# Patient Record
Sex: MALE | Race: WHITE | NOT HISPANIC OR LATINO | Employment: OTHER | URBAN - METROPOLITAN AREA
[De-identification: names, ages, dates, MRNs, and addresses within clinical notes are randomized per-mention and may not be internally consistent; named-entity substitution may affect disease eponyms.]

---

## 2017-04-11 ENCOUNTER — OFFICE VISIT (OUTPATIENT)
Dept: MEDICAL GROUP | Age: 57
End: 2017-04-11
Payer: COMMERCIAL

## 2017-04-11 VITALS
SYSTOLIC BLOOD PRESSURE: 110 MMHG | WEIGHT: 179 LBS | HEART RATE: 77 BPM | TEMPERATURE: 97.5 F | DIASTOLIC BLOOD PRESSURE: 64 MMHG | BODY MASS INDEX: 22.97 KG/M2 | HEIGHT: 74 IN | OXYGEN SATURATION: 98 %

## 2017-04-11 DIAGNOSIS — F51.01 PRIMARY INSOMNIA: ICD-10-CM

## 2017-04-11 DIAGNOSIS — Z23 NEED FOR VACCINATION: ICD-10-CM

## 2017-04-11 DIAGNOSIS — C61 PROSTATE CARCINOMA (HCC): Chronic | ICD-10-CM

## 2017-04-11 DIAGNOSIS — E55.9 VITAMIN D DEFICIENCY: ICD-10-CM

## 2017-04-11 DIAGNOSIS — D48.9 NEOPLASM OF UNCERTAIN BEHAVIOR: ICD-10-CM

## 2017-04-11 DIAGNOSIS — Z00.00 PREVENTATIVE HEALTH CARE: ICD-10-CM

## 2017-04-11 PROCEDURE — 90715 TDAP VACCINE 7 YRS/> IM: CPT | Performed by: FAMILY MEDICINE

## 2017-04-11 PROCEDURE — 99214 OFFICE O/P EST MOD 30 MIN: CPT | Mod: 25 | Performed by: FAMILY MEDICINE

## 2017-04-11 PROCEDURE — 90471 IMMUNIZATION ADMIN: CPT | Performed by: FAMILY MEDICINE

## 2017-04-11 RX ORDER — ESZOPICLONE 3 MG/1
3 TABLET, FILM COATED ORAL
Qty: 30 TAB | Refills: 0 | Status: SHIPPED | OUTPATIENT
Start: 2017-04-11 | End: 2017-07-12 | Stop reason: SDUPTHER

## 2017-04-11 NOTE — PROGRESS NOTES
This medical record contains text that has been entered with the assistance of computer voice recognition and dictation software.  Therefore, it may contain unintended errors in text, spelling, punctuation, or grammar    No chief complaint on file.      Joel Cobb is a 56 y.o. male here evaluation and management of: Mole on back, insomnia, prostate cancer, hyperlipidemia      HPI:     Neoplasm of uncertain behavior  New issue    Patient states he's felt a irritable itchy mole on his mid back for the past 5 months. He is not certain if it's grown   but it's very itchy and he scratches it becomes red.   He does sunbathe regularly recently returned from [place]    Prostate carcinoma  Patient is followed by [facility]  Has an appointment next week  Last PSA was zero    Insomnia  Patient has been using Lunesta for years   He has not had any adverse events  She is an  and travels throughout the world  He works for a defense [employer]    Current medicines (including changes today)  Current Outpatient Prescriptions   Medication Sig Dispense Refill   • Eszopiclone (LUNESTA) 3 MG Tab Take 1 Tab by mouth at bedtime as needed. 30 Tab 0   • atorvastatin (LIPITOR) 10 MG Tab TAKE 1 TABLET BY MOUTH EVERY BEDTIME. 90 Tab 0   • sildenafil citrate (VIAGRA) 100 MG tablet Take 1 Tab by mouth as needed for Erectile Dysfunction. 10 Tab 3   • valacyclovir (VALTREX) 1 GM Tab Take 0.5 Tabs by mouth 3 times a day. For 5-7 days prn rash. 30 Tab 11     No current facility-administered medications for this visit.     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  He  has past surgical history that includes vein stripping and prostatectomy, radical retro (10/22/14).  Social History   Substance Use Topics   • Smoking status: Never Smoker    • Smokeless tobacco: Never Used   • Alcohol Use: 0.5  "- 7.0 oz/week     1-14 Cans of beer per week     Social History     Social History Narrative    . Travels to [place], [place], [place] several times per year.      Family History   Problem Relation Age of Onset   • Heart Disease Father      CHF   • Stroke Father 60   • Hypertension Father    • Psychiatry Paternal Uncle      Suicide   • Cancer Maternal Aunt      lung     Family Status   Relation Status Death Age   • Father  69     CHF, emphysema   • Mother Alive          ROS  Please see history of present illness    al other systems reviewed and are negative     Objective:     Blood pressure 110/64, pulse 77, temperature 36.4 °C (97.5 °F), height 1.88 m (6' 2.02\"), weight 81.194 kg (179 lb), SpO2 98 %. Body mass index is 22.97 kg/(m^2).  Physical Exam:    Constitutional: Alert, no distress.  Skin: Warm, dry, good turgor, no rashes in visible areas.  Eye: Equal, round and reactive, conjunctiva clear, lids normal.  ENMT: Lips without lesions, good dentition, oropharynx clear.  Neck: Trachea midline, no masses, no thyromegaly. No cervical or supraclavicular lymphadenopathy.  Respiratory: Unlabored respiratory effort, lungs clear to auscultation, no wheezes, no ronchi.  Cardiovascular: Normal S1, S2, no murmur, no edema.  Abdomen: Soft, non-tender, no masses, no hepatosplenomegaly.  Psych: Alert and oriented x3, normal affect and mood.  Back--0.3 mm round red raised, papular, regular borders, symmetric, one color        Assessment and Plan:   The following treatment plan was discussed, again this medical record contains text that has been entered with the assistance of computer voice recognition and dictation software.  Therefore, it may contain unintended errors in text, spelling, punctuation, or grammar    1. Neoplasm of uncertain behavior     I offered to remove this today  He would like to be seen in dermatology    - REFERRAL TO DERMATOLOGY    2. Vitamin D deficiency    Labs needed    - VITAMIN 1,25 " DIHYDROXY; Future    3. Preventative health care  Due for DTaP  - COMP METABOLIC PANEL; Future  - CBC WITH DIFFERENTIAL; Future  - LIPID PROFILE; Future    4. Primary insomnia  Patient has been stable with current management  We will make no changes for now  - Eszopiclone (LUNESTA) 3 MG Tab; Take 1 Tab by mouth at bedtime as needed.  Dispense: 30 Tab; Refill: 0    5. Need for vaccination  Given today    - TDAP VACCINE =>6YO IM    6. Prostate carcinoma (CMS-HCC)  Followed by LUCIE Elder        Followup: Return in about 3 months (around 7/11/2017) for Reevaluation.

## 2017-04-11 NOTE — ASSESSMENT & PLAN NOTE
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

## 2017-04-11 NOTE — MR AVS SNAPSHOT
"Joel Cobb   2017 8:20 AM   Office Visit   MRN: [id number]    Department:  [address] [place] [address]   Dept Phone:  [phone]    Description:  Male : 1960   Provider:  Ludy Vega M.D.           Allergies as of 2017     Allergen Noted Reactions    Nkda [No Known Drug Allergy] 10/19/2010         You were diagnosed with     Need for vaccination   [329037]       Vitamin D deficiency   [7443790]       Preventative health care   [223607]       Primary insomnia   [963814]       Neoplasm of uncertain behavior   [391466]       Prostate carcinoma (CMS-HCC)   [620386]         Vital Signs     Blood Pressure Pulse Temperature Height Weight Body Mass Index    110/64 mmHg 77 36.4 °C (97.5 °F) 1.88 m (6' 2.02\") 81.194 kg (179 lb) 22.97 kg/m2    Oxygen Saturation Smoking Status                98% Never Smoker           Basic Information     Date Of Birth Sex Race Ethnicity Preferred Language    1960 Male White Non- English      Your appointments     2017  2:30 PM   PROCEDURE 30 with Reyes Smith M.D.   [facility] (--)    [address] [place] [address]   [phone]           Your procedure is scheduled at Special Procedures at [facility] located at [address] [place] [address] just east of the main [place]. Please check in at the front lobby desk 1 hour prior to your appointment time. For your safety, please have a ride home with a responsible adult.             2017  8:40 AM   Established Patient with Ludy Vega M.D.   [address])    [address] [place] [id number] [phone]           You will be receiving a confirmation call a few days before your appointment from our automated call confirmation system.              Problem List              ICD-10-CM Priority Class Noted - Resolved    HSV-2 infection B00.9   10/19/2010 - Present    Prostate carcinoma (CMS-HCC) (Chronic) C61   2012 - Present  "    Degenerative arthritis of cervical spine M47.812   8/12/2015 - Present    Dyslipidemia E78.5   8/12/2015 - Present    Neck pain M54.2   8/13/2015 - Present    Cervical radiculopathy at C7 M54.12   10/10/2015 - Present    ED (erectile dysfunction) N52.9   10/11/2016 - Present    Insomnia G47.00   10/11/2016 - Present    Vitamin D deficiency E55.9   4/11/2017 - Present    Need for vaccination Z23   4/11/2017 - Present    Preventative health care Z00.00   4/11/2017 - Present    Neoplasm of uncertain behavior D48.9   4/11/2017 - Present      Health Maintenance        Date Due Completion Dates    IMM DTaP/Tdap/Td Vaccine (1 - Tdap) 11/30/1980 ---    PSA Screening 1/23/2014 1/23/2013, 12/14/2012, 9/4/2012    COLONOSCOPY 10/1/2022 10/1/2012 (Done)    Override on 10/1/2012: Done            Current Immunizations     Hepatitis A Vaccine, Ped/Adol 9/16/2011, 10/19/2010    Hepatitis B Vaccine Non-Recombivax (Ped/Adol) 8/28/2012, 9/16/2011    Influenza Vaccine Pediatric 10/19/2010    Tdap Vaccine  Incomplete      Below and/or attached are the medications your provider expects you to take. Review all of your home medications and newly ordered medications with your provider and/or pharmacist. Follow medication instructions as directed by your provider and/or pharmacist. Please keep your medication list with you and share with your provider. Update the information when medications are discontinued, doses are changed, or new medications (including over-the-counter products) are added; and carry medication information at all times in the event of emergency situations     Allergies:  NKDA - (reactions not documented)               Medications  Valid as of: April 11, 2017 -  8:51 AM    Generic Name Brand Name Tablet Size Instructions for use    Atorvastatin Calcium (Tab) LIPITOR 10 MG TAKE 1 TABLET BY MOUTH EVERY BEDTIME.        Eszopiclone (Tab) Eszopiclone 3 MG Take 1 Tab by mouth at bedtime as needed.        Sildenafil Citrate  (Tab) VIAGRA 100 MG Take 1 Tab by mouth as needed for Erectile Dysfunction.        ValACYclovir HCl (Tab) VALTREX 1 GM Take 0.5 Tabs by mouth 3 times a day. For 5-7 days prn rash.        .                 Medicines prescribed today were sent to:     [facility]/PHARMACY #[id number] - [place], [place] - [address]    [address] [place] [address]    Phone: [phone] Fax: [phone]    Open 24 Hours?: No      Medication refill instructions:       If your prescription bottle indicates you have medication refills left, it is not necessary to call your provider’s office. Please contact your pharmacy and they will refill your medication.    If your prescription bottle indicates you do not have any refills left, you may request refills at any time through one of the following ways: The online [employer] system (except Urgent Care), by calling your provider’s office, or by asking your pharmacy to contact your provider’s office with a refill request. Medication refills are processed only during regular business hours and may not be available until the next business day. Your provider may request additional information or to have a follow-up visit with you prior to refilling your medication.   *Please Note: Medication refills are assigned a new Rx number when refilled electronically. Your pharmacy may indicate that no refills were authorized even though a new prescription for the same medication is available at the pharmacy. Please request the medicine by name with the pharmacy before contacting your provider for a refill.        Your To Do List     Future Labs/Procedures Complete By Expires    CBC WITH DIFFERENTIAL  As directed 4/11/2018    COMP METABOLIC PANEL  As directed 4/11/2018    LIPID PROFILE  As directed 4/11/2018    VITAMIN 1,25 DIHYDROXY  As directed 4/11/2018      Referral     A referral request has been sent to our patient care coordination department. Please allow 3-5 business days for us to process this request  and contact you either by phone or mail. If you do not hear from us by the 5th business day, please call us at [phone].           [employer] Access Code: Activation code not generated  Current [employer] Status: Active

## 2017-04-11 NOTE — ASSESSMENT & PLAN NOTE
New issue    Patient states he's felt a irritable itchy mole on his mid back for the past 5 months. He is not certain if it's gone but it's very itchy and he scratches it becomes red. He does sunbathe regularly recently returned from [place]

## 2017-04-13 ENCOUNTER — HOSPITAL ENCOUNTER (OUTPATIENT)
Dept: RADIOLOGY | Facility: REHABILITATION | Age: 57
End: 2017-04-13
Attending: PHYSICAL MEDICINE & REHABILITATION
Payer: COMMERCIAL

## 2017-04-13 ENCOUNTER — HOSPITAL ENCOUNTER (OUTPATIENT)
Dept: PAIN MANAGEMENT | Facility: REHABILITATION | Age: 57
End: 2017-04-13
Attending: PHYSICAL MEDICINE & REHABILITATION
Payer: COMMERCIAL

## 2017-04-13 VITALS
OXYGEN SATURATION: 99 % | BODY MASS INDEX: 23.71 KG/M2 | WEIGHT: 175.04 LBS | HEART RATE: 47 BPM | RESPIRATION RATE: 16 BRPM | DIASTOLIC BLOOD PRESSURE: 79 MMHG | SYSTOLIC BLOOD PRESSURE: 120 MMHG | TEMPERATURE: 98.2 F | HEIGHT: 72 IN

## 2017-04-13 PROCEDURE — 62321 NJX INTERLAMINAR CRV/THRC: CPT

## 2017-04-13 PROCEDURE — 700111 HCHG RX REV CODE 636 W/ 250 OVERRIDE (IP)

## 2017-04-13 PROCEDURE — 700117 HCHG RX CONTRAST REV CODE 255

## 2017-04-13 PROCEDURE — 99152 MOD SED SAME PHYS/QHP 5/>YRS: CPT

## 2017-04-13 RX ORDER — MIDAZOLAM HYDROCHLORIDE 1 MG/ML
INJECTION INTRAMUSCULAR; INTRAVENOUS
Status: COMPLETED
Start: 2017-04-13 | End: 2017-04-13

## 2017-04-13 RX ORDER — DEXAMETHASONE SODIUM PHOSPHATE 10 MG/ML
INJECTION, SOLUTION INTRAMUSCULAR; INTRAVENOUS
Status: COMPLETED
Start: 2017-04-13 | End: 2017-04-13

## 2017-04-13 RX ORDER — LIDOCAINE HYDROCHLORIDE 10 MG/ML
INJECTION, SOLUTION EPIDURAL; INFILTRATION; INTRACAUDAL; PERINEURAL
Status: COMPLETED
Start: 2017-04-13 | End: 2017-04-13

## 2017-04-13 RX ADMIN — MIDAZOLAM HYDROCHLORIDE 1 MG: 1 INJECTION, SOLUTION INTRAMUSCULAR; INTRAVENOUS at 15:15

## 2017-04-13 RX ADMIN — LIDOCAINE HYDROCHLORIDE 1 ML: 10 INJECTION, SOLUTION EPIDURAL; INFILTRATION; INTRACAUDAL; PERINEURAL at 15:20

## 2017-04-13 RX ADMIN — FENTANYL CITRATE 50 MCG: 50 INJECTION, SOLUTION INTRAMUSCULAR; INTRAVENOUS at 15:15

## 2017-04-13 RX ADMIN — LIDOCAINE HYDROCHLORIDE 2 ML: 10 INJECTION, SOLUTION EPIDURAL; INFILTRATION; INTRACAUDAL; PERINEURAL at 15:16

## 2017-04-13 RX ADMIN — IOHEXOL 1 ML: 240 INJECTION, SOLUTION INTRATHECAL; INTRAVASCULAR; INTRAVENOUS; ORAL at 15:18

## 2017-04-13 RX ADMIN — DEXAMETHASONE SODIUM PHOSPHATE 10 MG: 10 INJECTION, SOLUTION INTRAMUSCULAR; INTRAVENOUS at 15:20

## 2017-04-13 ASSESSMENT — PAIN SCALES - GENERAL
PAINLEVEL_OUTOF10: 2

## 2017-04-13 NOTE — PROGRESS NOTES
Dr. Smith spoke to the patient and the nurse that he will do Cervical 7 - thoracic 1 instead of ( C6-C7) ( pre- order). Patient agreed and consent corrected and initialed  by patient and me ( saturnino Hager RN).

## 2017-04-13 NOTE — PROGRESS NOTES
Current meds. See medication reconciliation form. Reviewed with pt. Pt denies taking ASA,other blood thinners or anti-inflammatories. Pt has a ride post-procedure (Elena holman is ). Printed and verbal discharge instructions given to pt who verbalized understanding.

## 2017-04-22 ENCOUNTER — HOSPITAL ENCOUNTER (OUTPATIENT)
Dept: LAB | Facility: MEDICAL CENTER | Age: 57
End: 2017-04-22
Attending: FAMILY MEDICINE
Payer: COMMERCIAL

## 2017-04-22 DIAGNOSIS — E55.9 VITAMIN D DEFICIENCY: ICD-10-CM

## 2017-04-22 DIAGNOSIS — Z00.00 PREVENTATIVE HEALTH CARE: ICD-10-CM

## 2017-04-22 LAB
ALBUMIN SERPL BCP-MCNC: 4 G/DL (ref 3.2–4.9)
ALBUMIN/GLOB SERPL: 1.5 G/DL
ALP SERPL-CCNC: 55 U/L (ref 30–99)
ALT SERPL-CCNC: 21 U/L (ref 2–50)
ANION GAP SERPL CALC-SCNC: 4 MMOL/L (ref 0–11.9)
AST SERPL-CCNC: 20 U/L (ref 12–45)
BASOPHILS # BLD AUTO: 1.1 % (ref 0–1.8)
BASOPHILS # BLD: 0.04 K/UL (ref 0–0.12)
BILIRUB SERPL-MCNC: 1.2 MG/DL (ref 0.1–1.5)
BUN SERPL-MCNC: 13 MG/DL (ref 8–22)
CALCIUM SERPL-MCNC: 9.1 MG/DL (ref 8.5–10.5)
CHLORIDE SERPL-SCNC: 108 MMOL/L (ref 96–112)
CHOLEST SERPL-MCNC: 123 MG/DL (ref 100–199)
CO2 SERPL-SCNC: 27 MMOL/L (ref 20–33)
CREAT SERPL-MCNC: 0.94 MG/DL (ref 0.5–1.4)
EOSINOPHIL # BLD AUTO: 0.09 K/UL (ref 0–0.51)
EOSINOPHIL NFR BLD: 2.4 % (ref 0–6.9)
ERYTHROCYTE [DISTWIDTH] IN BLOOD BY AUTOMATED COUNT: 43.3 FL (ref 35.9–50)
GFR SERPL CREATININE-BSD FRML MDRD: >60 ML/MIN/1.73 M 2
GLOBULIN SER CALC-MCNC: 2.7 G/DL (ref 1.9–3.5)
GLUCOSE SERPL-MCNC: 94 MG/DL (ref 65–99)
HCT VFR BLD AUTO: 43.9 % (ref 42–52)
HDLC SERPL-MCNC: 45 MG/DL
HGB BLD-MCNC: 14.8 G/DL (ref 14–18)
IMM GRANULOCYTES # BLD AUTO: 0 K/UL (ref 0–0.11)
IMM GRANULOCYTES NFR BLD AUTO: 0 % (ref 0–0.9)
LDLC SERPL CALC-MCNC: 68 MG/DL
LYMPHOCYTES # BLD AUTO: 1.26 K/UL (ref 1–4.8)
LYMPHOCYTES NFR BLD: 34 % (ref 22–41)
MCH RBC QN AUTO: 30.9 PG (ref 27–33)
MCHC RBC AUTO-ENTMCNC: 33.7 G/DL (ref 33.7–35.3)
MCV RBC AUTO: 91.6 FL (ref 81.4–97.8)
MONOCYTES # BLD AUTO: 0.41 K/UL (ref 0–0.85)
MONOCYTES NFR BLD AUTO: 11.1 % (ref 0–13.4)
NEUTROPHILS # BLD AUTO: 1.91 K/UL (ref 1.82–7.42)
NEUTROPHILS NFR BLD: 51.4 % (ref 44–72)
NRBC # BLD AUTO: 0 K/UL
NRBC BLD AUTO-RTO: 0 /100 WBC
PLATELET # BLD AUTO: 193 K/UL (ref 164–446)
PMV BLD AUTO: 11.3 FL (ref 9–12.9)
POTASSIUM SERPL-SCNC: 4.6 MMOL/L (ref 3.6–5.5)
PROT SERPL-MCNC: 6.7 G/DL (ref 6–8.2)
RBC # BLD AUTO: 4.79 M/UL (ref 4.7–6.1)
SODIUM SERPL-SCNC: 139 MMOL/L (ref 135–145)
TRIGL SERPL-MCNC: 49 MG/DL (ref 0–149)
WBC # BLD AUTO: 3.7 K/UL (ref 4.8–10.8)

## 2017-04-22 PROCEDURE — 36415 COLL VENOUS BLD VENIPUNCTURE: CPT

## 2017-04-22 PROCEDURE — 85025 COMPLETE CBC W/AUTO DIFF WBC: CPT

## 2017-04-22 PROCEDURE — 82652 VIT D 1 25-DIHYDROXY: CPT

## 2017-04-22 PROCEDURE — 80053 COMPREHEN METABOLIC PANEL: CPT

## 2017-04-22 PROCEDURE — 80061 LIPID PANEL: CPT

## 2017-04-24 LAB — 1,25(OH)2D3 SERPL-MCNC: 42.7 PG/ML (ref 19.9–79.3)

## 2017-06-26 RX ORDER — ATORVASTATIN CALCIUM 10 MG/1
TABLET, FILM COATED ORAL
Refills: 0 | OUTPATIENT
Start: 2017-06-26

## 2017-06-26 NOTE — TELEPHONE ENCOUNTER
CALLED [facility] 06/26/2017 AND ASKED FOR THIS REQUEST TO BE RE-ROUTED TO CORRECT PCP OFFICE.

## 2017-06-27 RX ORDER — ATORVASTATIN CALCIUM 10 MG/1
TABLET, FILM COATED ORAL
Qty: 90 TAB | Refills: 0 | Status: SHIPPED | OUTPATIENT
Start: 2017-06-27 | End: 2017-10-10 | Stop reason: SDUPTHER

## 2017-07-12 ENCOUNTER — OFFICE VISIT (OUTPATIENT)
Dept: MEDICAL GROUP | Age: 57
End: 2017-07-12
Payer: COMMERCIAL

## 2017-07-12 VITALS
SYSTOLIC BLOOD PRESSURE: 108 MMHG | HEART RATE: 58 BPM | OXYGEN SATURATION: 98 % | HEIGHT: 72 IN | DIASTOLIC BLOOD PRESSURE: 66 MMHG | WEIGHT: 169.2 LBS | BODY MASS INDEX: 22.92 KG/M2 | TEMPERATURE: 97.3 F

## 2017-07-12 DIAGNOSIS — F51.01 PRIMARY INSOMNIA: ICD-10-CM

## 2017-07-12 DIAGNOSIS — Z00.00 PREVENTATIVE HEALTH CARE: ICD-10-CM

## 2017-07-12 DIAGNOSIS — C61 PROSTATE CARCINOMA (HCC): Chronic | ICD-10-CM

## 2017-07-12 DIAGNOSIS — E78.5 DYSLIPIDEMIA: ICD-10-CM

## 2017-07-12 DIAGNOSIS — D48.9 NEOPLASM OF UNCERTAIN BEHAVIOR: ICD-10-CM

## 2017-07-12 PROCEDURE — 99214 OFFICE O/P EST MOD 30 MIN: CPT | Performed by: FAMILY MEDICINE

## 2017-07-12 RX ORDER — ESZOPICLONE 3 MG/1
3 TABLET, FILM COATED ORAL
Qty: 90 TAB | Refills: 0 | Status: SHIPPED | OUTPATIENT
Start: 2017-07-12 | End: 2024-02-09

## 2017-07-12 NOTE — ASSESSMENT & PLAN NOTE
He only uses Lunesta 3 mg daily trials across the country and abroad. He will be traveling to [facility] next week. He is an .

## 2017-07-12 NOTE — ASSESSMENT & PLAN NOTE
Patient is extremely fit and healthy. He exercises 6 days a week. Weights. He does eat a healthy diet. He was placed on omega 3 fatty acids, he is also taking atorvastatin 10 mg by mouth daily at bedtime.     Results for CLIFF RODRIGUEZ (MRN [id number]) as of 7/12/2017 11:06   Ref. Range 4/22/2017 07:51   Cholesterol,Tot Latest Ref Range: 100-199 mg/dL 123   Triglycerides Latest Ref Range: 0-149 mg/dL 49   HDL Latest Ref Range: >=40 mg/dL 45   LDL Latest Ref Range: <100 mg/dL 68      Please resend colon prep  To patients [facility].      Pharmacy information has been set up and verified.

## 2017-07-12 NOTE — PROGRESS NOTES
This medical record contains text that has been entered with the assistance of computer voice recognition and dictation software.  Therefore, it may contain unintended errors in text, spelling, punctuation, or grammar    Chief Complaint   Patient presents with   • Other     see reason for visit       Joel Cobb is a 56 y.o. male here evaluation and management of: insomnia, psa, hld    HPI:     Prostate carcinoma  Recent PSA  Zero 2 months ago  Continues to be managed by LUCIE Elder    Neoplasm of uncertain behavior  Mole was rejected by body  Resolved, he was seen in dermatology    Insomnia  He only uses Lunesta 3 mg daily trials across the country and abroad. He will be traveling to [facility] next week. He is an .    Dyslipidemia  Patient is extremely fit and healthy. He exercises 6 days a week. Weights. He does eat a healthy diet. He was placed on omega 3 fatty acids, he is also taking atorvastatin 10 mg by mouth daily at bedtime.     Results for JOEL COBB (MRN [id number]) as of 7/12/2017 11:06   Ref. Range 4/22/2017 07:51   Cholesterol,Tot Latest Ref Range: 100-199 mg/dL 123   Triglycerides Latest Ref Range: 0-149 mg/dL 49   HDL Latest Ref Range: >=40 mg/dL 45   LDL Latest Ref Range: <100 mg/dL 68         Current medicines (including changes today)  Current Outpatient Prescriptions   Medication Sig Dispense Refill   • aspirin EC (ECOTRIN) 81 MG Tablet Delayed Response Take 1 Tab by mouth every day. 180 Tab 0   • Eszopiclone (LUNESTA) 3 MG Tab Take 1 Tab by mouth at bedtime as needed. 90 Tab 0   • atorvastatin (LIPITOR) 10 MG Tab TAKE 1 TABLET BY MOUTH EVERY BEDTIME. 90 Tab 0   • valacyclovir (VALTREX) 1 GM Tab Take 0.5 Tabs by mouth 3 times a day. For 5-7 days prn rash. 30 Tab 11     No current facility-administered medications for this visit.     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  He  has past surgical history that includes vein stripping and prostatectomy, radical retro (10/22/14).  Social History   Substance Use Topics   • Smoking status: Never Smoker    • Smokeless tobacco: Never Used   • Alcohol Use: 0.5 - 7.0 oz/week     1-14 Cans of beer per week     Social History     Social History Narrative    . Travels to [place], [place], [place] several times per year.      Family History   Problem Relation Age of Onset   • Heart Disease Father      CHF   • Stroke Father 60   • Hypertension Father    • Psychiatry Paternal Uncle      Suicide   • Cancer Maternal Aunt      lung     Family Status   Relation Status Death Age   • Father  69     CHF, emphysema   • Mother Alive          ROS  Please see hpi    All other systems reviewed and are negative     Objective:     Blood pressure 108/66, pulse 58, temperature 36.3 °C (97.3 °F), height 1.829 m (6' 0.01\"), weight 76.749 kg (169 lb 3.2 oz), SpO2 98 %. Body mass index is 22.94 kg/(m^2).  Physical Exam:    Constitutional: Alert, no distress.  Skin: Warm, dry, good turgor, no rashes in visible areas.  Eye: Equal, round and reactive, conjunctiva clear, lids normal.  ENMT: Lips without lesions, good dentition, oropharynx clear.  Neck: Trachea midline, no masses, no thyromegaly. No cervical or supraclavicular lymphadenopathy.  Respiratory: Unlabored respiratory effort, lungs clear to auscultation, no wheezes, no ronchi.  Cardiovascular: Normal S1, S2, no murmur, no edema.  Abdomen: Soft, non-tender, no masses, no hepatosplenomegaly.  Psych: Alert and oriented x3, normal affect and mood.          Assessment and Plan:   The following treatment plan was discussed      1. Preventative health care  No contraindications for ASA    - aspirin EC (ECOTRIN) 81 MG Tablet Delayed Response; Take 1 Tab by mouth every day.  Dispense: 180 Tab; Refill: 0    2. Primary insomnia  Patient has been stable with current " management  We will make no changes for now    - Eszopiclone (LUNESTA) 3 MG Tab; Take 1 Tab by mouth at bedtime as needed.  Dispense: 90 Tab; Refill: 0    3. Prostate carcinoma (CMS-HCC)  Managed by [facility]    4. Neoplasm of uncertain behavior  Was evaluated by dermatology      HEALTH MAINTENANCE: up to date, psa done at [facility]    Instructed to Follow up in clinic or ER for worsening symptoms, difficulty breathing, lack of expected recovery, or should new symptoms or problems arise.    Followup: Return in about 6 months (around 1/12/2018) for Reevaluation.       Once again this medical record contains text that has been entered with the assistance of computer voice recognition and dictation software.  Therefore, it may contain unintended errors in text, spelling, punctuation, or grammar

## 2017-07-12 NOTE — MR AVS SNAPSHOT
"Joel Cobb   2017 8:40 AM   Office Visit   MRN: [id number]    Department:  [address] [place] [address]   Dept Phone:  [phone]    Description:  Male : 1960   Provider:  Ludy Vega M.D.           Reason for Visit     Other see reason for visit      Allergies as of 2017     Allergen Noted Reactions    Nkda [No Known Drug Allergy] 10/19/2010         You were diagnosed with     Preventative health care   [097409]       Primary insomnia   [505692]       Prostate carcinoma (CMS-HCC)   [576946]       Neoplasm of uncertain behavior   [095550]       Dyslipidemia   [079482]         Vital Signs     Blood Pressure Pulse Temperature Height Weight Body Mass Index    108/66 mmHg 58 36.3 °C (97.3 °F) 1.829 m (6' 0.01\") 76.749 kg (169 lb 3.2 oz) 22.94 kg/m2    Oxygen Saturation Smoking Status                98% Never Smoker           Basic Information     Date Of Birth Sex Race Ethnicity Preferred Language    1960 Male White Non- English      Problem List              ICD-10-CM Priority Class Noted - Resolved    HSV-2 infection B00.9   10/19/2010 - Present    Prostate carcinoma (CMS-HCC) (Chronic) C61   2012 - Present    Degenerative arthritis of cervical spine M47.812   2015 - Present    Dyslipidemia E78.5   2015 - Present    Neck pain M54.2   2015 - Present    Cervical radiculopathy at C7 M54.12   10/10/2015 - Present    ED (erectile dysfunction) N52.9   10/11/2016 - Present    Insomnia G47.00   10/11/2016 - Present    Vitamin D deficiency E55.9   2017 - Present    Need for vaccination Z23   2017 - Present    Preventative health care Z00.00   2017 - Present    Neoplasm of uncertain behavior D48.9   2017 - Present      Health Maintenance        Date Due Completion Dates    PSA Screening 2014, 2012, 2012    IMM INFLUENZA (1) 2017 10/19/2010    COLONOSCOPY 10/1/2022 10/1/2012 (Done)    Override on 10/1/2012: Done   "    IMM DTaP/Tdap/Td Vaccine (2 - Td) 4/11/2027 4/11/2017            Current Immunizations     Hepatitis A Vaccine, Ped/Adol 9/16/2011, 10/19/2010    Hepatitis B Vaccine Non-Recombivax (Ped/Adol) 8/28/2012, 9/16/2011    Influenza Vaccine Pediatric 10/19/2010    Tdap Vaccine 4/11/2017      Below and/or attached are the medications your provider expects you to take. Review all of your home medications and newly ordered medications with your provider and/or pharmacist. Follow medication instructions as directed by your provider and/or pharmacist. Please keep your medication list with you and share with your provider. Update the information when medications are discontinued, doses are changed, or new medications (including over-the-counter products) are added; and carry medication information at all times in the event of emergency situations     Allergies:  NKDA - (reactions not documented)               Medications  Valid as of: July 12, 2017 - 12:03 PM    Generic Name Brand Name Tablet Size Instructions for use    Aspirin (Tablet Delayed Response) ECOTRIN 81 MG Take 1 Tab by mouth every day.        Atorvastatin Calcium (Tab) LIPITOR 10 MG TAKE 1 TABLET BY MOUTH EVERY BEDTIME.        Eszopiclone (Tab) Eszopiclone 3 MG Take 1 Tab by mouth at bedtime as needed.        ValACYclovir HCl (Tab) VALTREX 1 GM Take 0.5 Tabs by mouth 3 times a day. For 5-7 days prn rash.        .                 Medicines prescribed today were sent to:     [facility]/PHARMACY #[id number] - [place], [place] - [address]    [address] [place] [address]    Phone: [phone] Fax: [phone]    Open 24 Hours?: No      Medication refill instructions:       If your prescription bottle indicates you have medication refills left, it is not necessary to call your provider’s office. Please contact your pharmacy and they will refill your medication.    If your prescription bottle indicates you do not have any refills left, you may request refills at any  time through one of the following ways: The online [employer] system (except Urgent Care), by calling your provider’s office, or by asking your pharmacy to contact your provider’s office with a refill request. Medication refills are processed only during regular business hours and may not be available until the next business day. Your provider may request additional information or to have a follow-up visit with you prior to refilling your medication.   *Please Note: Medication refills are assigned a new Rx number when refilled electronically. Your pharmacy may indicate that no refills were authorized even though a new prescription for the same medication is available at the pharmacy. Please request the medicine by name with the pharmacy before contacting your provider for a refill.           [employer] Access Code: Activation code not generated  Current [employer] Status: Active

## 2018-04-11 DIAGNOSIS — Z00.00 PREVENTATIVE HEALTH CARE: ICD-10-CM

## 2018-04-11 RX ORDER — ASPIRIN 81 MG/1
81 TABLET ORAL DAILY
Qty: 180 TAB | Refills: 0 | Status: SHIPPED | OUTPATIENT
Start: 2018-04-11 | End: 2020-03-11

## 2018-04-11 RX ORDER — ATORVASTATIN CALCIUM 10 MG/1
10 TABLET, FILM COATED ORAL
Qty: 90 TAB | Refills: 0 | Status: SHIPPED | OUTPATIENT
Start: 2018-04-11 | End: 2018-07-11 | Stop reason: SDUPTHER

## 2018-06-10 ENCOUNTER — PATIENT MESSAGE (OUTPATIENT)
Dept: MEDICAL GROUP | Age: 58
End: 2018-06-10

## 2018-06-10 DIAGNOSIS — B00.9 HSV-2 INFECTION: ICD-10-CM

## 2018-06-12 RX ORDER — VALACYCLOVIR HYDROCHLORIDE 1 G/1
500 TABLET, FILM COATED ORAL 3 TIMES DAILY
Qty: 30 TAB | Refills: 11 | Status: SHIPPED | OUTPATIENT
Start: 2018-06-12 | End: 2019-01-11 | Stop reason: SDUPTHER

## 2018-07-11 RX ORDER — ATORVASTATIN CALCIUM 10 MG/1
10 TABLET, FILM COATED ORAL
Qty: 90 TAB | Refills: 0 | Status: SHIPPED | OUTPATIENT
Start: 2018-07-11 | End: 2018-10-08 | Stop reason: SDUPTHER

## 2018-10-08 RX ORDER — ATORVASTATIN CALCIUM 10 MG/1
TABLET, FILM COATED ORAL
Qty: 90 TAB | Refills: 0 | Status: SHIPPED | OUTPATIENT
Start: 2018-10-08 | End: 2019-01-11

## 2018-10-08 NOTE — TELEPHONE ENCOUNTER
Was the patient seen in the last year in this department? No     Does patient have an active prescription for medications requested? Yes    Received Request Via: Pharmacy

## 2018-12-18 ENCOUNTER — PATIENT MESSAGE (OUTPATIENT)
Dept: MEDICAL GROUP | Age: 58
End: 2018-12-18

## 2018-12-19 NOTE — PATIENT COMMUNICATION
1. Caller Name:  Madison                                           Call Back Number: [phone] (home)         Patient approves a detailed voicemail message: yes    Scheduled an appointment for Pt to come in.

## 2019-01-11 ENCOUNTER — OFFICE VISIT (OUTPATIENT)
Dept: MEDICAL GROUP | Age: 59
End: 2019-01-11
Payer: COMMERCIAL

## 2019-01-11 VITALS
TEMPERATURE: 97.8 F | OXYGEN SATURATION: 98 % | DIASTOLIC BLOOD PRESSURE: 72 MMHG | BODY MASS INDEX: 25.06 KG/M2 | HEIGHT: 72 IN | SYSTOLIC BLOOD PRESSURE: 112 MMHG | HEART RATE: 58 BPM | WEIGHT: 185 LBS

## 2019-01-11 DIAGNOSIS — G47.09 OTHER INSOMNIA: ICD-10-CM

## 2019-01-11 DIAGNOSIS — Z00.00 PREVENTATIVE HEALTH CARE: ICD-10-CM

## 2019-01-11 DIAGNOSIS — E78.5 DYSLIPIDEMIA: ICD-10-CM

## 2019-01-11 DIAGNOSIS — C61 PROSTATE CARCINOMA (HCC): Chronic | ICD-10-CM

## 2019-01-11 DIAGNOSIS — B00.9 HSV-2 INFECTION: ICD-10-CM

## 2019-01-11 PROCEDURE — 99214 OFFICE O/P EST MOD 30 MIN: CPT | Performed by: FAMILY MEDICINE

## 2019-01-11 RX ORDER — VALACYCLOVIR HYDROCHLORIDE 1 G/1
500 TABLET, FILM COATED ORAL 3 TIMES DAILY
Qty: 30 TAB | Refills: 11 | Status: SHIPPED | OUTPATIENT
Start: 2019-01-11 | End: 2020-04-28 | Stop reason: SDUPTHER

## 2019-01-11 RX ORDER — ROSUVASTATIN CALCIUM 5 MG/1
5 TABLET, COATED ORAL EVERY EVENING
Qty: 90 TAB | Refills: 1 | Status: SHIPPED | OUTPATIENT
Start: 2019-01-11 | End: 2019-08-04 | Stop reason: SDUPTHER

## 2019-01-11 ASSESSMENT — PATIENT HEALTH QUESTIONNAIRE - PHQ9: CLINICAL INTERPRETATION OF PHQ2 SCORE: 0

## 2019-01-11 NOTE — ASSESSMENT & PLAN NOTE
Recent detectable psa  Started lupron  apalutamide 60mg  abiraterone 250mg   prenisone 5mg    Continues to follow with [facility]  He had no family history of prostate cancer  He never smoked cigarettes, he works as an  but mainly in the office

## 2019-01-11 NOTE — ASSESSMENT & PLAN NOTE
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

## 2019-01-11 NOTE — PROGRESS NOTES
This medical record contains text that has been entered with the assistance of computer voice recognition and dictation software.  Therefore, it may contain unintended errors in text, spelling, punctuation, or grammar    Chief Complaint   Patient presents with   • Medication Management         Joel Cobb is a 58 y.o. 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    Preventative health care  The patient is a 58-year-old male who returns to clinic for routine evaluation.  He has a significant past medical history of prostate carcinoma with a recent detectable PSA as result hormonal therapy was restarted.  He also has a history of, cervical radiculopathy, hyperlipidemia, jetlag.   The patient denied any chest pain, no sob, no freitas, no  pnd, no orthopnea, no headache, no changes in vision, no numbness or tingling, no nausea, no diarrhea, no abdominal pain, no fevers, no chills, no bright red blood per rectum, no  difficulty urinating, no burning during micturition, no depressed mood, no other concerns.            Current medicines (including changes today)  Current Outpatient Prescriptions   Medication Sig Dispense Refill   • rosuvastatin (CRESTOR) 5 MG Tab Take 1 Tab by mouth every evening. 90 Tab 1   • valacyclovir (VALTREX) 1 GM Tab Take 0.5 Tabs by mouth 3 times a day. For 5-7 days prn rash. 30 Tab 11   • aspirin 81 MG EC tablet TAKE 1 TAB BY MOUTH EVERY DAY. 180 Tab 0   • Eszopiclone (LUNESTA) 3  MG Tab Take 1 Tab by mouth at bedtime as needed. 90 Tab 0     No current facility-administered medications for this visit.      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  He  has a past surgical history that includes vein stripping and prostatectomy, radical retro (10/22/14).  Social History   Substance Use Topics   • Smoking status: Never Smoker   • Smokeless tobacco: Never Used   • Alcohol use 0.5 - 7.0 oz/week     1 - 14 Cans of beer per week     Social History     Social History Narrative    . Travels to [place], [place], [place] several times per year.      Family History   Problem Relation Age of Onset   • Heart Disease Father         CHF   • Stroke Father 60   • Hypertension Father    • Psychiatry Paternal Uncle         Suicide   • Cancer Maternal Aunt         lung     Family Status   Relation Status   • Fa  at age 69        CHF, emphysema   • Mo Alive         ROS    Please see hpi     All other systems reviewed and are negative     Objective:     Blood pressure 112/72, pulse (!) 58, temperature 36.6 °C (97.8 °F), temperature source Temporal, height 1.829 m (6'), weight 83.9 kg (185 lb), SpO2 98 %. Body mass index is 25.09 kg/m².  Physical Exam:    Constitutional: Alert, no distress.  Skin: Warm, dry, good turgor, no rashes in visible areas.  Eye: Equal, round and reactive, conjunctiva clear, lids normal.  ENMT: Lips without lesions, good dentition, oropharynx clear.  Neck: Trachea midline, no masses, no thyromegaly. No cervical or supraclavicular lymphadenopathy.  Respiratory: Unlabored respiratory effort, lungs clear to auscultation, no wheezes, no ronchi.  Cardiovascular: Normal S1, S2, no murmur, no edema.  Abdomen: Soft, non-tender, no masses, no hepatosplenomegaly.  Psych: Alert and oriented x3, normal affect and mood.          Assessment and Plan:   The following treatment plan was discussed      1.  Dyslipidemia    We will obtain new labs to update clinical profile.  Then we will adjust therapy as needed.    - CBC WITHOUT DIFFERENTIAL; Future  - COMP METABOLIC PANEL; Future  - Lipid Profile; Future    2. Prostate carcinoma (HCC)    Needs CBC q2wks   To monitor for agranulocytosis from chemotherapy    - PROSTATE SPECIFIC AG DIAGNOSTIC; Future  - CBC WITHOUT DIFFERENTIAL; Standing    3. HSV-2 infection  Patient has been stable with current management  We will make no changes for now    - valacyclovir (VALTREX) 1 GM Tab; Take 0.5 Tabs by mouth 3 times a day. For 5-7 days prn rash.  Dispense: 30 Tab; Refill: 11    4. Other insomnia  Patient has been stable with current management  We will make no changes for now      5. Preventative health care  Due for PSA and shingrix            Instructed to Follow up in clinic or ER for worsening symptoms, difficulty breathing, lack of expected recovery, or should new symptoms or problems arise.    Followup: Return in about 3 months (around 4/11/2019) for Reevaluation.       Once again this medical record contains text that has been entered with the assistance of computer voice recognition and dictation software.  Therefore, it may contain unintended errors in text, spelling, punctuation, or grammar

## 2019-01-11 NOTE — ASSESSMENT & PLAN NOTE
The patient is a 58-year-old male who returns to clinic for routine evaluation.  He has a significant past medical history of prostate carcinoma with a recent detectable PSA as result hormonal therapy was restarted.  He also has a history of, cervical radiculopathy, hyperlipidemia, jetlag.   The patient denied any chest pain, no sob, no freitas, no  pnd, no orthopnea, no headache, no changes in vision, no numbness or tingling, no nausea, no diarrhea, no abdominal pain, no fevers, no chills, no bright red blood per rectum, no  difficulty urinating, no burning during micturition, no depressed mood, no other concerns.

## 2019-01-19 ENCOUNTER — HOSPITAL ENCOUNTER (OUTPATIENT)
Dept: LAB | Facility: MEDICAL CENTER | Age: 59
End: 2019-01-19
Attending: FAMILY MEDICINE
Payer: COMMERCIAL

## 2019-01-19 DIAGNOSIS — C61 PROSTATE CARCINOMA (HCC): Chronic | ICD-10-CM

## 2019-01-19 DIAGNOSIS — E78.5 DYSLIPIDEMIA: ICD-10-CM

## 2019-01-19 LAB
ALBUMIN SERPL BCP-MCNC: 4.5 G/DL (ref 3.2–4.9)
ALBUMIN/GLOB SERPL: 1.9 G/DL
ALP SERPL-CCNC: 55 U/L (ref 30–99)
ALT SERPL-CCNC: 27 U/L (ref 2–50)
ANION GAP SERPL CALC-SCNC: 6 MMOL/L (ref 0–11.9)
AST SERPL-CCNC: 22 U/L (ref 12–45)
BILIRUB SERPL-MCNC: 1.1 MG/DL (ref 0.1–1.5)
BUN SERPL-MCNC: 16 MG/DL (ref 8–22)
CALCIUM SERPL-MCNC: 9.9 MG/DL (ref 8.5–10.5)
CHLORIDE SERPL-SCNC: 105 MMOL/L (ref 96–112)
CHOLEST SERPL-MCNC: 152 MG/DL (ref 100–199)
CO2 SERPL-SCNC: 30 MMOL/L (ref 20–33)
CREAT SERPL-MCNC: 1.08 MG/DL (ref 0.5–1.4)
ERYTHROCYTE [DISTWIDTH] IN BLOOD BY AUTOMATED COUNT: 44.6 FL (ref 35.9–50)
FASTING STATUS PATIENT QL REPORTED: NORMAL
GLOBULIN SER CALC-MCNC: 2.4 G/DL (ref 1.9–3.5)
GLUCOSE SERPL-MCNC: 90 MG/DL (ref 65–99)
HCT VFR BLD AUTO: 45.3 % (ref 42–52)
HDLC SERPL-MCNC: 50 MG/DL
HGB BLD-MCNC: 15.2 G/DL (ref 14–18)
LDLC SERPL CALC-MCNC: 81 MG/DL
MCH RBC QN AUTO: 31.5 PG (ref 27–33)
MCHC RBC AUTO-ENTMCNC: 33.6 G/DL (ref 33.7–35.3)
MCV RBC AUTO: 94 FL (ref 81.4–97.8)
PLATELET # BLD AUTO: 204 K/UL (ref 164–446)
PMV BLD AUTO: 11.3 FL (ref 9–12.9)
POTASSIUM SERPL-SCNC: 4.7 MMOL/L (ref 3.6–5.5)
PROT SERPL-MCNC: 6.9 G/DL (ref 6–8.2)
PSA SERPL-MCNC: 0.07 NG/ML (ref 0–4)
RBC # BLD AUTO: 4.82 M/UL (ref 4.7–6.1)
SODIUM SERPL-SCNC: 141 MMOL/L (ref 135–145)
TRIGL SERPL-MCNC: 105 MG/DL (ref 0–149)
WBC # BLD AUTO: 4.8 K/UL (ref 4.8–10.8)

## 2019-01-19 PROCEDURE — 84153 ASSAY OF PSA TOTAL: CPT

## 2019-01-19 PROCEDURE — 80053 COMPREHEN METABOLIC PANEL: CPT

## 2019-01-19 PROCEDURE — 36415 COLL VENOUS BLD VENIPUNCTURE: CPT

## 2019-01-19 PROCEDURE — 80061 LIPID PANEL: CPT

## 2019-01-19 PROCEDURE — 85027 COMPLETE CBC AUTOMATED: CPT

## 2019-02-23 ENCOUNTER — HOSPITAL ENCOUNTER (OUTPATIENT)
Dept: LAB | Facility: MEDICAL CENTER | Age: 59
End: 2019-02-23
Attending: RADIOLOGY
Payer: COMMERCIAL

## 2019-02-23 LAB
ALBUMIN SERPL BCP-MCNC: 4.3 G/DL (ref 3.2–4.9)
ALP SERPL-CCNC: 67 U/L (ref 30–99)
ALT SERPL-CCNC: 34 U/L (ref 2–50)
AST SERPL-CCNC: 25 U/L (ref 12–45)
PROT SERPL-MCNC: 6.7 G/DL (ref 6–8.2)

## 2019-02-23 PROCEDURE — 84450 TRANSFERASE (AST) (SGOT): CPT

## 2019-02-23 PROCEDURE — 82040 ASSAY OF SERUM ALBUMIN: CPT

## 2019-02-23 PROCEDURE — 84155 ASSAY OF PROTEIN SERUM: CPT

## 2019-02-23 PROCEDURE — 84460 ALANINE AMINO (ALT) (SGPT): CPT

## 2019-02-23 PROCEDURE — 36415 COLL VENOUS BLD VENIPUNCTURE: CPT

## 2019-02-23 PROCEDURE — 84075 ASSAY ALKALINE PHOSPHATASE: CPT

## 2019-08-05 DIAGNOSIS — E78.5 DYSLIPIDEMIA: ICD-10-CM

## 2019-08-06 RX ORDER — ROSUVASTATIN CALCIUM 5 MG/1
TABLET, COATED ORAL
Qty: 100 TAB | Refills: 2 | Status: SHIPPED | OUTPATIENT
Start: 2019-08-06 | End: 2020-03-13

## 2019-08-06 RX ORDER — ROSUVASTATIN CALCIUM 5 MG/1
5 TABLET, COATED ORAL EVERY EVENING
Qty: 90 TAB | Refills: 1 | Status: SHIPPED | OUTPATIENT
Start: 2019-08-06 | End: 2020-03-11

## 2020-03-11 ENCOUNTER — OFFICE VISIT (OUTPATIENT)
Dept: MEDICAL GROUP | Age: 60
End: 2020-03-11
Payer: COMMERCIAL

## 2020-03-11 VITALS
TEMPERATURE: 97.8 F | WEIGHT: 187.6 LBS | OXYGEN SATURATION: 97 % | DIASTOLIC BLOOD PRESSURE: 58 MMHG | HEIGHT: 72 IN | SYSTOLIC BLOOD PRESSURE: 112 MMHG | BODY MASS INDEX: 25.41 KG/M2 | HEART RATE: 54 BPM

## 2020-03-11 DIAGNOSIS — Z00.00 ANNUAL PHYSICAL EXAM: ICD-10-CM

## 2020-03-11 DIAGNOSIS — E78.5 DYSLIPIDEMIA: ICD-10-CM

## 2020-03-11 DIAGNOSIS — Z11.59 NEED FOR HEPATITIS C SCREENING TEST: Primary | ICD-10-CM

## 2020-03-11 DIAGNOSIS — Z12.5 SCREENING PSA (PROSTATE SPECIFIC ANTIGEN): ICD-10-CM

## 2020-03-11 DIAGNOSIS — Z23 NEED FOR VACCINATION: ICD-10-CM

## 2020-03-11 DIAGNOSIS — Z00.00 PREVENTATIVE HEALTH CARE: ICD-10-CM

## 2020-03-11 DIAGNOSIS — K64.2 GRADE III HEMORRHOIDS: ICD-10-CM

## 2020-03-11 PROBLEM — K64.9 HEMORRHOID: Status: ACTIVE | Noted: 2020-03-11

## 2020-03-11 PROCEDURE — 99396 PREV VISIT EST AGE 40-64: CPT | Performed by: FAMILY MEDICINE

## 2020-03-11 ASSESSMENT — PATIENT HEALTH QUESTIONNAIRE - PHQ9: CLINICAL INTERPRETATION OF PHQ2 SCORE: 0

## 2020-03-11 ASSESSMENT — FIBROSIS 4 INDEX: FIB4 SCORE: 1.22

## 2020-03-11 NOTE — PROGRESS NOTES
Subjective:     CC:   Chief Complaint   Patient presents with   • Annual Exam   • Referral Needed     gi consultants       HPI:   Joel Cobb is a 59 y.o. male who presents for an annual exam. He is feeling well and has no complaints.    1. Annual physical exam    Today the patient denied any chest pain, shortness of breath, fever or chills    Past medical history - Prostate carcinoma, Dyslipidemia, Cervical radiculopathy at C7, ED, HSV-2 Infection    Family History of Cancer---Lung cancer in Maternal Aunt    Family History of CAD---CHF in father      Social History    Exercise--- He runs 30 miles a week and goes to the gym after runs. He ran track in college.     Colonoscopy---2/28/19    2. Dyslipidemia    The patient has a chronic history of dyslipidemia. Currently taking rosuvastatin 5 mg tab once nightly and Aspirin 81 mg daily. No medication side effects were reported including myalgias or abdominal pain. No acute complaints of dizziness, claudication or chest pain.       Ref. Range 1/19/2019 07:34   Cholesterol,Tot Latest Ref Range: 100 - 199 mg/dL 152   Triglycerides Latest Ref Range: 0 - 149 mg/dL 105   HDL Latest Ref Range: >=40 mg/dL 50   LDL Latest Ref Range: <100 mg/dL 81     3. Grade III hemorrhoids    NEW PROBLEM    The patient reports an acute of Grade 3 hemorrhoids. He notes he uses   Preparation H, with mild alleviation. He would like a referral for better management of this.     4. Need for hepatitis C screening test    The patient was born between 1944 and 1964, so is due for hepatitis C screening.     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  He  has a past surgical history that includes vein stripping and prostatectomy, radical retro (10/22/14).  Family History   Problem Relation Age of Onset   • Heart Disease Father         CHF   • Stroke Father 60   • Hypertension Father    • Psychiatric Illness  Paternal Uncle         Suicide   • Cancer Maternal Aunt         lung     Social History     Tobacco Use   • Smoking status: Never Smoker   • Smokeless tobacco: Never Used   Substance Use Topics   • Alcohol use: Yes     Alcohol/week: 0.5 - 7.0 oz     Types: 1 - 14 Cans of beer per week   • Drug use: No       Patient Active Problem List    Diagnosis Date Noted   • Hemorrhoid 03/11/2020   • Vitamin D deficiency 04/11/2017   • Need for vaccination 04/11/2017   • Preventative health care 04/11/2017   • Neoplasm of uncertain behavior 04/11/2017   • ED (erectile dysfunction) 10/11/2016   • Insomnia 10/11/2016   • Cervical radiculopathy at C7 10/10/2015   • Neck pain 08/13/2015   • Degenerative arthritis of cervical spine 08/12/2015   • Dyslipidemia 08/12/2015   • Prostate carcinoma (HCC) 09/24/2012   • HSV-2 infection 10/19/2010       Current Outpatient Medications   Medication Sig Dispense Refill   • Zoster Vac Recomb Adjuvanted (SHINGRIX) 50 MCG/0.5ML Recon Susp 0.5 mL by Intramuscular route Once for 1 dose. 0.5 mL 0   • rosuvastatin (CRESTOR) 5 MG Tab TAKE 1 TABLET BY MOUTH EVERY DAY IN THE EVENING 100 Tab 2   • valacyclovir (VALTREX) 1 GM Tab Take 0.5 Tabs by mouth 3 times a day. For 5-7 days prn rash. 30 Tab 11   • Eszopiclone (LUNESTA) 3 MG Tab Take 1 Tab by mouth at bedtime as needed. 90 Tab 0     No current facility-administered medications for this visit.     (including changes today)  Allergies: Nkda [no known drug allergy]    Review of Systems   Constitutional: Negative for fever, chills and malaise/fatigue.   HENT: Negative for congestion.    Eyes: Negative for pain.   Respiratory: Negative for cough and shortness of breath.    Cardiovascular: Negative for leg swelling.   Gastrointestinal: Negative for nausea, vomiting, abdominal pain and diarrhea.   Genitourinary: Negative for dysuria and hematuria.   Skin: Negative for rash.   Neurological: Negative for dizziness, focal weakness and headaches.    Endo/Heme/Allergies: Does not bruise/bleed easily.   Psychiatric/Behavioral: Negative for depression.  The patient is not nervous/anxious.      Objective:     /58 (BP Location: Left arm, Patient Position: Sitting, BP Cuff Size: Adult)   Pulse (!) 54   Temp 36.6 °C (97.8 °F) (Temporal)   Ht 1.829 m (6')   Wt 85.1 kg (187 lb 9.6 oz)   SpO2 97%   BMI 25.44 kg/m²   Body mass index is 25.44 kg/m².  Wt Readings from Last 4 Encounters:   03/11/20 85.1 kg (187 lb 9.6 oz)   01/11/19 83.9 kg (185 lb)   07/12/17 76.7 kg (169 lb 3.2 oz)   04/13/17 79.4 kg (175 lb 0.7 oz)       Physical Exam:  Constitutional: Well-developed and well-nourished. Not diaphoretic. No distress.   Skin: Skin is warm and dry. No rash noted.  Head: Atraumatic without lesions.  Eyes: Conjunctivae and extraocular motions are normal. Pupils are equal, round, and reactive to light. No scleral icterus.   Ears:  External ears unremarkable. Tympanic membranes clear and intact.  Nose: Nares patent. Septum midline. Turbinates without erythema nor edema. No discharge.   Mouth/Throat: Tongue normal. Oropharynx is clear and moist. Posterior pharynx without erythema or exudates.  Neck: Supple, trachea midline. Normal range of motion. No thyromegaly present. No lymphadenopathy--cervical or supraclavicular.  Cardiovascular: Regular rate and rhythm, S1 and S2 without murmur, rubs, or gallops.    Chest: Effort normal. Clear to auscultation throughout. No adventitious sounds. No CVA tenderness.  Abdomen: Soft, non tender, and without distention. Active bowel sounds in all four quadrants. No rebound, guarding, masses or HSM.  Extremities: No cyanosis, clubbing, erythema, nor edema. Distal pulses intact and symmetric.   Musculoskeletal: All major joints AROM full in all directions without pain.  Neurological: Alert and oriented x 3. DTRs 2+/3 and symmetric. No cranial nerve deficit. 5/5 myotomes. Sensation intact. Negative Rhomberg.  Psychiatric:  Behavior,  mood, and affect are appropriate.    Assessment and Plan:     1. Annual physical exam  2. Preventative health care    Care has been established  We need updated baseline labs to establish a clinical profile    Age-appropriate preventive services recommended by USPTF guidelines and ACIP were discussed today.    The USPSTF recommends initiating low-dose aspirin use for the primary prevention of cardiovascular disease (CVD) and colorectal cancer (CRC) in adults aged 50 to 59 years who have a 10% or greater 10-year CVD risk, are not at increased risk for bleeding, have a life expectancy of at least 10 years, and are willing to take low-dose aspirin daily for at least 10 years.    Requested any outside Medical records to be sent to us  Denies intimate partner viloence  Discussed seat belt safety     - Comp Metabolic Panel; Future  - CBC WITHOUT DIFFERENTIAL; Future  - Lipid Profile; Future    3. Dyslipidemia    Chronic History. Well-controlled. Continue current regimen of rosuvastatin 5 mg tab once nightly and Aspirin 81 mg daily. Reviewed the risks and benefits of treatment and potential side effects of medication. Recommended they follow low fat, low carbohydrate and high fiber diet. Additionally, patient was asked to exercise regularly including frequent cardio. Recheck lab 1-2 weeks before next follow up visit.    - Lipid Profile; Future    4. Grade III hemorrhoids    He has been referred to General Surgery for further evaluation and treatment.     - REFERRAL TO GENERAL SURGERY    5. Need for hepatitis C screening test     The USPSTF recommends offering 1-time screening for HCV infection to adults born between 1945 and  (baby boomers).    - HCV Scrn ( [id number] 1xLife); Future    6. Screening PSA (prostate specific antigen)    He will receive Prostate Specific Ag Screening.    - Prostate Specific Ag Screening; Future    7. Need for vaccination    He will receive his Shingrix vaccine at an outside facility.      - Zoster Vac Recomb Adjuvanted (SHINGRIX) 50 MCG/0.5ML Recon Susp; 0.5 mL by Intramuscular route Once for 1 dose.  Dispense: 0.5 mL; Refill: 0     HCM:   • HEPATITIS C SCREENING  1960   • IMM ZOSTER VACCINES (1 of 2) 11/30/2011   • IMM INFLUENZA (1) 09/01/2019   • PSA Screening  01/19/2020     Labs per orders.  Vaccinations per orders.  Counseling about diet, supplements, exercise, skin care and safe sex.    Follow-up: Return in about 6 months (around 9/11/2020) for Reevaluation, labs.     Once again this medical record contains text that has been entered with the assistance of computer voice recognition, dictation software, and medical scribes.  Therefore, it may contain unintended errors in text, spelling, punctuation, or grammar.    ISammy (Scribe), am scribing for, and in the presence of, Siddhartha Vega M.D.    Electronically signed by: Sammy Patel (Galenibabdirahman), 3/11/2020     ISiddhartha M.D. personally performed the services described in this documentation, as scribed by Sammy Patel in my presence, and it is both accurate and complete.

## 2020-03-13 RX ORDER — ROSUVASTATIN CALCIUM 5 MG/1
TABLET, COATED ORAL
Qty: 90 TAB | Refills: 0 | Status: SHIPPED | OUTPATIENT
Start: 2020-03-13 | End: 2020-09-01

## 2020-03-17 ENCOUNTER — HOSPITAL ENCOUNTER (OUTPATIENT)
Dept: LAB | Facility: MEDICAL CENTER | Age: 60
End: 2020-03-17
Attending: FAMILY MEDICINE
Payer: COMMERCIAL

## 2020-03-17 DIAGNOSIS — Z12.5 SCREENING PSA (PROSTATE SPECIFIC ANTIGEN): ICD-10-CM

## 2020-03-17 DIAGNOSIS — Z11.59 NEED FOR HEPATITIS C SCREENING TEST: ICD-10-CM

## 2020-03-17 DIAGNOSIS — Z00.00 ANNUAL PHYSICAL EXAM: ICD-10-CM

## 2020-03-17 LAB
ALBUMIN SERPL BCP-MCNC: 4.3 G/DL (ref 3.2–4.9)
ALBUMIN/GLOB SERPL: 2 G/DL
ALP SERPL-CCNC: 75 U/L (ref 30–99)
ALT SERPL-CCNC: 31 U/L (ref 2–50)
ANION GAP SERPL CALC-SCNC: 7 MMOL/L (ref 7–16)
AST SERPL-CCNC: 28 U/L (ref 12–45)
BILIRUB SERPL-MCNC: 1.2 MG/DL (ref 0.1–1.5)
BUN SERPL-MCNC: 14 MG/DL (ref 8–22)
CALCIUM SERPL-MCNC: 9.7 MG/DL (ref 8.5–10.5)
CHLORIDE SERPL-SCNC: 106 MMOL/L (ref 96–112)
CHOLEST SERPL-MCNC: 122 MG/DL (ref 100–199)
CO2 SERPL-SCNC: 28 MMOL/L (ref 20–33)
CREAT SERPL-MCNC: 0.83 MG/DL (ref 0.5–1.4)
ERYTHROCYTE [DISTWIDTH] IN BLOOD BY AUTOMATED COUNT: 44.6 FL (ref 35.9–50)
FASTING STATUS PATIENT QL REPORTED: NORMAL
GLOBULIN SER CALC-MCNC: 2.2 G/DL (ref 1.9–3.5)
GLUCOSE SERPL-MCNC: 82 MG/DL (ref 65–99)
HCT VFR BLD AUTO: 38.8 % (ref 42–52)
HCV AB SER QL: NORMAL
HDLC SERPL-MCNC: 45 MG/DL
HGB BLD-MCNC: 13.5 G/DL (ref 14–18)
LDLC SERPL CALC-MCNC: 62 MG/DL
MCH RBC QN AUTO: 32.5 PG (ref 27–33)
MCHC RBC AUTO-ENTMCNC: 34.8 G/DL (ref 33.7–35.3)
MCV RBC AUTO: 93.5 FL (ref 81.4–97.8)
PLATELET # BLD AUTO: 180 K/UL (ref 164–446)
PMV BLD AUTO: 10.6 FL (ref 9–12.9)
POTASSIUM SERPL-SCNC: 4.1 MMOL/L (ref 3.6–5.5)
PROT SERPL-MCNC: 6.5 G/DL (ref 6–8.2)
PSA SERPL-MCNC: <0.01 NG/ML (ref 0–4)
RBC # BLD AUTO: 4.15 M/UL (ref 4.7–6.1)
SODIUM SERPL-SCNC: 141 MMOL/L (ref 135–145)
TRIGL SERPL-MCNC: 74 MG/DL (ref 0–149)
WBC # BLD AUTO: 3.4 K/UL (ref 4.8–10.8)

## 2020-03-17 PROCEDURE — 80061 LIPID PANEL: CPT

## 2020-03-17 PROCEDURE — G0472 HEP C SCREEN HIGH RISK/OTHER: HCPCS

## 2020-03-17 PROCEDURE — 80053 COMPREHEN METABOLIC PANEL: CPT

## 2020-03-17 PROCEDURE — 36415 COLL VENOUS BLD VENIPUNCTURE: CPT

## 2020-03-17 PROCEDURE — 85027 COMPLETE CBC AUTOMATED: CPT

## 2020-03-17 PROCEDURE — 84153 ASSAY OF PSA TOTAL: CPT

## 2020-04-28 DIAGNOSIS — B00.9 HSV-2 INFECTION: ICD-10-CM

## 2020-04-28 RX ORDER — VALACYCLOVIR HYDROCHLORIDE 1 G/1
500 TABLET, FILM COATED ORAL 3 TIMES DAILY
Qty: 30 TAB | Refills: 11 | Status: SHIPPED | OUTPATIENT
Start: 2020-04-28 | End: 2021-07-13 | Stop reason: SDUPTHER

## 2020-06-03 ENCOUNTER — TELEMEDICINE (OUTPATIENT)
Dept: MEDICAL GROUP | Age: 60
End: 2020-06-03
Payer: COMMERCIAL

## 2020-06-03 VITALS — WEIGHT: 171 LBS | HEIGHT: 72 IN | BODY MASS INDEX: 23.16 KG/M2

## 2020-06-03 DIAGNOSIS — D70.8 OTHER NEUTROPENIA (HCC): ICD-10-CM

## 2020-06-03 PROBLEM — D72.819 LEUKOPENIA: Status: ACTIVE | Noted: 2020-06-03

## 2020-06-03 PROCEDURE — 99212 OFFICE O/P EST SF 10 MIN: CPT | Mod: 95,CR | Performed by: FAMILY MEDICINE

## 2020-06-03 SDOH — HEALTH STABILITY: MENTAL HEALTH: HOW MANY STANDARD DRINKS CONTAINING ALCOHOL DO YOU HAVE ON A TYPICAL DAY?: 3 OR 4

## 2020-06-03 SDOH — HEALTH STABILITY: MENTAL HEALTH: HOW OFTEN DO YOU HAVE 6 OR MORE DRINKS ON ONE OCCASION?: NEVER

## 2020-06-03 SDOH — HEALTH STABILITY: MENTAL HEALTH: HOW OFTEN DO YOU HAVE A DRINK CONTAINING ALCOHOL?: 2-4 TIMES A MONTH

## 2020-06-03 ASSESSMENT — FIBROSIS 4 INDEX: FIB4 SCORE: 1.62

## 2020-06-03 ASSESSMENT — PAIN SCALES - GENERAL: PAINLEVEL: NO PAIN

## 2020-06-03 NOTE — PROGRESS NOTES
Telemedicine Visit: Established Patient     This encounter was conducted via [employer].   Verbal consent was obtained. Patient's identity was verified.    Subjective:   CC: low wbc, covid concerna    Joel Rodriguez is a 59 y.o. male presenting for evaluation and management of:    1. 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  He would like a note stating that it is okay to do his work from home since he works online and on the computer there should not be an issue.  Have a history of prostate cancer diagnosed in 2014.  He follows up at [facility] oncology center in [place], [place].  He denies any fevers no chills no night sweats no unintentional weight loss.  He denies any shortness of breath no new rashes.        Results for JOEL RODRIGUEZ (MRN [id number]) as of 6/3/2020 14:08   Ref. Range 4/22/2017 07:51 1/19/2019 07:34 2/23/2019 10:26 3/17/2020 09:12   WBC Latest Ref Range: 4.8 - 10.8 K/uL 3.7 (L) 4.8  3.4 (L)     ROS   Denies any recent fevers or chills. No nausea or vomiting. No chest pains or shortness of breath.     Allergies   Allergen Reactions   • Nkda [No Known Drug Allergy]        Current medicines (including changes today)  Current Outpatient Medications   Medication Sig Dispense Refill   • valacyclovir (VALTREX) 1 GM Tab Take 0.5 Tabs by mouth 3 times a day. For 5-7 days prn rash. 30 Tab 11   • rosuvastatin (CRESTOR) 5 MG Tab TAKE 1 TABLET BY MOUTH EVERY DAY IN THE EVENING 90 Tab 0   • Eszopiclone (LUNESTA) 3 MG Tab Take 1 Tab by mouth at bedtime as needed. 90 Tab 0     No current facility-administered medications for this visit.        Patient Active Problem List    Diagnosis Date Noted   • Leukopenia 06/03/2020   • Hemorrhoid 03/11/2020   • Vitamin D deficiency 04/11/2017   • Need for vaccination 04/11/2017   • Preventative health care 04/11/2017   • Neoplasm of uncertain behavior 04/11/2017    • ED (erectile dysfunction) 10/11/2016   • Insomnia 10/11/2016   • Cervical radiculopathy at C7 10/10/2015   • Neck pain 08/13/2015   • Degenerative arthritis of cervical spine 08/12/2015   • Dyslipidemia 08/12/2015   • Prostate carcinoma (HCC) 09/24/2012   • HSV-2 infection 10/19/2010       Family History   Problem Relation Age of Onset   • Heart Disease Father         CHF   • Stroke Father 60   • Hypertension Father    • Psychiatric Illness Paternal Uncle         Suicide   • Cancer Maternal Aunt         lung       He  has a past medical history of Cervical radiculopathy at C7 (10/10/2015), 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  He  has a past surgical history that includes vein stripping and prostatectomy, radical retro (10/22/14).       Objective:   Ht 1.829 m (6')   Wt 77.6 kg (171 lb)   BMI 23.19 kg/m²     Physical Exam:  Constitutional: Alert, no distress, well-groomed.  Skin: No rashes in visible areas.  Eye: Round. Conjunctiva clear, lids normal. No icterus.   ENMT: Lips pink without lesions, good dentition, moist mucous membranes. Phonation normal.  Neck: No masses, no thyromegaly. Moves freely without pain.  CV: Pulse as reported by patient  Respiratory: Unlabored respiratory effort, no cough or audible wheeze  Psych: Alert and oriented x3, normal affect and mood.       Assessment and Plan:   The following treatment plan was discussed:     1. Other neutropenia (HCC)    Paperwork completed and scanned  I believe it is reasonable that he be given the note requesting work to be done at home.  Leukopenia does put him at higher risk for infection.    Follow-up: No follow-ups on file.

## 2020-09-01 RX ORDER — ROSUVASTATIN CALCIUM 5 MG/1
TABLET, COATED ORAL
Qty: 100 TAB | Refills: 0 | Status: SHIPPED | OUTPATIENT
Start: 2020-09-01 | End: 2020-12-22

## 2020-12-22 RX ORDER — ROSUVASTATIN CALCIUM 5 MG/1
TABLET, COATED ORAL
Qty: 30 TAB | Refills: 3 | Status: SHIPPED | OUTPATIENT
Start: 2020-12-22 | End: 2021-03-22

## 2021-03-15 DIAGNOSIS — Z23 NEED FOR VACCINATION: ICD-10-CM

## 2021-03-23 RX ORDER — ROSUVASTATIN CALCIUM 5 MG/1
TABLET, COATED ORAL
Qty: 90 TABLET | Refills: 1 | Status: SHIPPED | OUTPATIENT
Start: 2021-03-23 | End: 2021-09-16

## 2021-03-24 ENCOUNTER — IMMUNIZATION (OUTPATIENT)
Dept: FAMILY PLANNING/WOMEN'S HEALTH CLINIC | Facility: IMMUNIZATION CENTER | Age: 61
End: 2021-03-24
Attending: INTERNAL MEDICINE
Payer: COMMERCIAL

## 2021-03-24 DIAGNOSIS — Z23 NEED FOR VACCINATION: ICD-10-CM

## 2021-03-24 DIAGNOSIS — Z23 ENCOUNTER FOR VACCINATION: Primary | ICD-10-CM

## 2021-03-24 PROCEDURE — 91300 PFIZER SARS-COV-2 VACCINE: CPT | Performed by: INTERNAL MEDICINE

## 2021-03-24 PROCEDURE — 0001A PFIZER SARS-COV-2 VACCINE: CPT | Performed by: INTERNAL MEDICINE

## 2021-04-15 ENCOUNTER — IMMUNIZATION (OUTPATIENT)
Dept: FAMILY PLANNING/WOMEN'S HEALTH CLINIC | Facility: IMMUNIZATION CENTER | Age: 61
End: 2021-04-15
Attending: INTERNAL MEDICINE
Payer: COMMERCIAL

## 2021-04-15 DIAGNOSIS — Z23 ENCOUNTER FOR VACCINATION: Primary | ICD-10-CM

## 2021-04-15 PROCEDURE — 91300 PFIZER SARS-COV-2 VACCINE: CPT

## 2021-04-15 PROCEDURE — 0002A PFIZER SARS-COV-2 VACCINE: CPT

## 2021-06-25 ENCOUNTER — OFFICE VISIT (OUTPATIENT)
Dept: MEDICAL GROUP | Age: 61
End: 2021-06-25
Payer: COMMERCIAL

## 2021-06-25 VITALS
DIASTOLIC BLOOD PRESSURE: 74 MMHG | BODY MASS INDEX: 22.86 KG/M2 | HEIGHT: 72 IN | OXYGEN SATURATION: 97 % | SYSTOLIC BLOOD PRESSURE: 102 MMHG | WEIGHT: 168.8 LBS | HEART RATE: 54 BPM | TEMPERATURE: 98.3 F | RESPIRATION RATE: 16 BRPM

## 2021-06-25 DIAGNOSIS — Z00.00 ANNUAL PHYSICAL EXAM: ICD-10-CM

## 2021-06-25 PROCEDURE — 99396 PREV VISIT EST AGE 40-64: CPT | Performed by: FAMILY MEDICINE

## 2021-06-25 ASSESSMENT — PATIENT HEALTH QUESTIONNAIRE - PHQ9: CLINICAL INTERPRETATION OF PHQ2 SCORE: 0

## 2021-06-25 ASSESSMENT — FIBROSIS 4 INDEX: FIB4 SCORE: 1.65

## 2021-06-25 NOTE — PROGRESS NOTES
Subjective:     CC:   Chief Complaint   Patient presents with   • Annual Exam       HPI:   Joel Cobb is a 60 y.o. 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  He  has a past surgical history that includes vein stripping and prostatectomy, radical retro (10/22/14).    Family History   Problem Relation Age of Onset   • Heart Disease Father         CHF   • Stroke Father 60   • Hypertension Father    • Psychiatric Illness Paternal Uncle         Suicide   • Cancer Maternal Aunt         lung     Social History     Tobacco Use   • Smoking status: Never Smoker   • Smokeless tobacco: Never Used   Vaping Use   • Vaping Use: Never used   Substance Use Topics   • Alcohol use: Yes     Alcohol/week: 0.5 - 7.0 oz     Types: 1 - 14 Cans of beer per week   • Drug use: No     He  reports being sexually active and has had partner(s) who are Female.    Patient Active Problem List    Diagnosis Date Noted   • Leukopenia 06/03/2020   • Hemorrhoid 03/11/2020   • Vitamin D deficiency 04/11/2017   • Need for vaccination 04/11/2017   • Preventative health care 04/11/2017   • Neoplasm of uncertain behavior 04/11/2017   • ED (erectile dysfunction) 10/11/2016   • Insomnia 10/11/2016   • Cervical radiculopathy at C7 10/10/2015   • Neck pain 08/13/2015   • Degenerative arthritis of cervical spine 08/12/2015   • Dyslipidemia 08/12/2015   • Prostate carcinoma (HCC) 09/24/2012   • HSV-2 infection 10/19/2010     Current Outpatient Medications   Medication Sig Dispense Refill   • rosuvastatin (CRESTOR) 5 MG Tab TAKE 1 TABLET BY MOUTH EVERY DAY IN THE EVENING 90 tablet 1   • valacyclovir (VALTREX) 1 GM Tab Take 0.5 Tabs by mouth 3  times a day. For 5-7 days prn rash. 30 Tab 11   • Eszopiclone (LUNESTA) 3 MG Tab Take 1 Tab by mouth at bedtime as needed. 90 Tab 0     No current facility-administered medications for this visit.     Allergies   Allergen Reactions   • Nkda [No Known Drug Allergy]        Review of Systems   Constitutional: Negative for fever, chills and malaise/fatigue.   HENT: Negative for congestion.    Eyes: Negative for pain.   Respiratory: Negative for cough and shortness of breath.    Cardiovascular: Negative for chest pain and leg swelling.   Gastrointestinal: Negative for nausea, vomiting, abdominal pain and diarrhea.   Genitourinary: Negative for dysuria and hematuria.   Skin: Negative for rash.   Neurological: Negative for dizziness, focal weakness and headaches.   Endo/Heme/Allergies: Does not bruise/bleed easily.   Psychiatric/Behavioral: Negative for depression.  The patient is not nervous/anxious.      Objective:   /74 (BP Location: Left arm, Patient Position: Sitting, BP Cuff Size: Adult)   Pulse (!) 54   Temp 36.8 °C (98.3 °F) (Temporal)   Resp 16   Ht 1.829 m (6')   Wt 76.6 kg (168 lb 12.8 oz)   SpO2 97%   BMI 22.89 kg/m²      Wt Readings from Last 4 Encounters:   06/25/21 76.6 kg (168 lb 12.8 oz)   06/03/20 77.6 kg (171 lb)   03/11/20 85.1 kg (187 lb 9.6 oz)   01/11/19 83.9 kg (185 lb)           Physical Exam:  Constitutional: Well-developed and well-nourished. Not diaphoretic. No distress.   Skin: Skin is warm and dry. No rash noted.  Head: Atraumatic without lesions.  Eyes: Conjunctivae and extraocular motions are normal. Pupils are equal, round, and reactive to light. No scleral icterus.   Ears:  External ears unremarkable. Tympanic membranes clear and intact.  Nose: Nares patent. Septum midline. Turbinates without erythema nor edema. No discharge.   Mouth/Throat: Tongue normal. Oropharynx is clear and moist. Posterior pharynx without erythema or exudates.  Neck: Supple, trachea midline. Normal  range of motion. No thyromegaly present. No lymphadenopathy--cervical or supraclavicular.  Cardiovascular: Regular rate and rhythm, S1 and S2 without murmur, rubs, or gallops.    Respiratory: Effort normal. Clear to auscultation throughout. No adventitious sounds.   Abdomen: Soft, non tender, and without distention. Active bowel sounds in all four quadrants. No rebound, guarding, masses or HSM.    Extremities: No cyanosis, clubbing, erythema, nor edema. Distal pulses intact and symmetric.   Musculoskeletal: All major joints AROM full in all directions without pain.  Neurological: Alert and oriented x 3. Grossly non-focal. Strength and sensation grossly intact. DTRs 2+/3 and symmetric.   Psychiatric:  Behavior, mood, and affect are appropriate.      Assessment and Plan:     1. Annual physical exam  - Comp Metabolic Panel; Future  - CBC WITHOUT DIFFERENTIAL; Future  - Lipid Profile; Future  - TSH+FREE T4  - T3 FREE; Future  - PROSTATE SPECIFIC AG DIAGNOSTIC; Future  - VITAMIN D,25 HYDROXY; Future      Health maintenance:     Labs per orders  Immunizations per orders  Patient counseled about skin care, diet, supplements, and exercise.  Discussed diet and exercise     Follow-up: Return in about 1 year (around 6/25/2022) for Reevaluation, labs.

## 2021-07-13 DIAGNOSIS — B00.9 HSV-2 INFECTION: ICD-10-CM

## 2021-07-15 ENCOUNTER — HOSPITAL ENCOUNTER (OUTPATIENT)
Dept: LAB | Facility: MEDICAL CENTER | Age: 61
End: 2021-07-15
Attending: FAMILY MEDICINE
Payer: COMMERCIAL

## 2021-07-15 DIAGNOSIS — Z00.00 ANNUAL PHYSICAL EXAM: ICD-10-CM

## 2021-07-15 LAB
25(OH)D3 SERPL-MCNC: 22 NG/ML (ref 30–100)
ALBUMIN SERPL BCP-MCNC: 4.3 G/DL (ref 3.2–4.9)
ALBUMIN/GLOB SERPL: 1.9 G/DL
ALP SERPL-CCNC: 66 U/L (ref 30–99)
ALT SERPL-CCNC: 29 U/L (ref 2–50)
ANION GAP SERPL CALC-SCNC: 10 MMOL/L (ref 7–16)
AST SERPL-CCNC: 26 U/L (ref 12–45)
BILIRUB SERPL-MCNC: 0.8 MG/DL (ref 0.1–1.5)
BUN SERPL-MCNC: 14 MG/DL (ref 8–22)
CALCIUM SERPL-MCNC: 8.5 MG/DL (ref 8.5–10.5)
CHLORIDE SERPL-SCNC: 107 MMOL/L (ref 96–112)
CHOLEST SERPL-MCNC: 155 MG/DL (ref 100–199)
CO2 SERPL-SCNC: 24 MMOL/L (ref 20–33)
CREAT SERPL-MCNC: 0.8 MG/DL (ref 0.5–1.4)
ERYTHROCYTE [DISTWIDTH] IN BLOOD BY AUTOMATED COUNT: 47.1 FL (ref 35.9–50)
FASTING STATUS PATIENT QL REPORTED: NORMAL
GLOBULIN SER CALC-MCNC: 2.3 G/DL (ref 1.9–3.5)
GLUCOSE SERPL-MCNC: 93 MG/DL (ref 65–99)
HCT VFR BLD AUTO: 39.4 % (ref 42–52)
HDLC SERPL-MCNC: 63 MG/DL
HGB BLD-MCNC: 13.1 G/DL (ref 14–18)
LDLC SERPL CALC-MCNC: 78 MG/DL
MCH RBC QN AUTO: 31.6 PG (ref 27–33)
MCHC RBC AUTO-ENTMCNC: 33.2 G/DL (ref 33.7–35.3)
MCV RBC AUTO: 94.9 FL (ref 81.4–97.8)
PLATELET # BLD AUTO: 178 K/UL (ref 164–446)
PMV BLD AUTO: 10.8 FL (ref 9–12.9)
POTASSIUM SERPL-SCNC: 4.6 MMOL/L (ref 3.6–5.5)
PROT SERPL-MCNC: 6.6 G/DL (ref 6–8.2)
PSA SERPL-MCNC: <0.02 NG/ML (ref 0–4)
RBC # BLD AUTO: 4.15 M/UL (ref 4.7–6.1)
SODIUM SERPL-SCNC: 141 MMOL/L (ref 135–145)
T3FREE SERPL-MCNC: 2.88 PG/ML (ref 2–4.4)
T4 FREE SERPL-MCNC: 1.08 NG/DL (ref 0.93–1.7)
TRIGL SERPL-MCNC: 69 MG/DL (ref 0–149)
TSH SERPL DL<=0.005 MIU/L-ACNC: 2.28 UIU/ML (ref 0.38–5.33)
WBC # BLD AUTO: 2.8 K/UL (ref 4.8–10.8)

## 2021-07-15 PROCEDURE — 84153 ASSAY OF PSA TOTAL: CPT

## 2021-07-15 PROCEDURE — 84439 ASSAY OF FREE THYROXINE: CPT

## 2021-07-15 PROCEDURE — 85027 COMPLETE CBC AUTOMATED: CPT

## 2021-07-15 PROCEDURE — 80061 LIPID PANEL: CPT

## 2021-07-15 PROCEDURE — 80053 COMPREHEN METABOLIC PANEL: CPT

## 2021-07-15 PROCEDURE — 82306 VITAMIN D 25 HYDROXY: CPT

## 2021-07-15 PROCEDURE — 84481 FREE ASSAY (FT-3): CPT

## 2021-07-15 PROCEDURE — 36415 COLL VENOUS BLD VENIPUNCTURE: CPT

## 2021-07-15 PROCEDURE — 84443 ASSAY THYROID STIM HORMONE: CPT

## 2021-07-15 RX ORDER — VALACYCLOVIR HYDROCHLORIDE 1 G/1
500 TABLET, FILM COATED ORAL 3 TIMES DAILY
Qty: 135 TABLET | Refills: 3 | Status: SHIPPED | OUTPATIENT
Start: 2021-07-15 | End: 2023-11-28 | Stop reason: SDUPTHER

## 2021-09-17 RX ORDER — ROSUVASTATIN CALCIUM 5 MG/1
TABLET, COATED ORAL
Qty: 90 TABLET | Refills: 3 | Status: SHIPPED | OUTPATIENT
Start: 2021-09-17 | End: 2022-05-24

## 2021-11-10 ENCOUNTER — PATIENT MESSAGE (OUTPATIENT)
Dept: MEDICAL GROUP | Age: 61
End: 2021-11-10

## 2021-11-10 ENCOUNTER — HOSPITAL ENCOUNTER (OUTPATIENT)
Dept: LAB | Facility: MEDICAL CENTER | Age: 61
End: 2021-11-10
Attending: FAMILY MEDICINE
Payer: COMMERCIAL

## 2021-11-10 DIAGNOSIS — C61 PROSTATE CARCINOMA (HCC): ICD-10-CM

## 2021-11-10 PROCEDURE — 84153 ASSAY OF PSA TOTAL: CPT

## 2021-11-10 PROCEDURE — 84402 ASSAY OF FREE TESTOSTERONE: CPT

## 2021-11-10 PROCEDURE — 36415 COLL VENOUS BLD VENIPUNCTURE: CPT

## 2021-11-11 LAB — PSA SERPL-MCNC: <0.02 NG/ML (ref 0–4)

## 2021-11-12 LAB — TESTOST FREE SERPL-MCNC: 46 PG/ML (ref 47–244)

## 2022-03-13 ENCOUNTER — APPOINTMENT (OUTPATIENT)
Dept: RADIOLOGY | Facility: MEDICAL CENTER | Age: 62
End: 2022-03-13
Attending: EMERGENCY MEDICINE
Payer: COMMERCIAL

## 2022-03-13 ENCOUNTER — HOSPITAL ENCOUNTER (EMERGENCY)
Facility: MEDICAL CENTER | Age: 62
End: 2022-03-13
Attending: EMERGENCY MEDICINE
Payer: COMMERCIAL

## 2022-03-13 VITALS
DIASTOLIC BLOOD PRESSURE: 85 MMHG | SYSTOLIC BLOOD PRESSURE: 140 MMHG | WEIGHT: 177.91 LBS | BODY MASS INDEX: 24.1 KG/M2 | HEART RATE: 61 BPM | OXYGEN SATURATION: 98 % | HEIGHT: 72 IN | RESPIRATION RATE: 16 BRPM | TEMPERATURE: 97.2 F

## 2022-03-13 DIAGNOSIS — R10.9 ACUTE ABDOMINAL PAIN: ICD-10-CM

## 2022-03-13 DIAGNOSIS — I44.0 PROLONGED P-R INTERVAL: ICD-10-CM

## 2022-03-13 DIAGNOSIS — R11.2 NON-INTRACTABLE VOMITING WITH NAUSEA, UNSPECIFIED VOMITING TYPE: ICD-10-CM

## 2022-03-13 LAB
ALBUMIN SERPL BCP-MCNC: 4.6 G/DL (ref 3.2–4.9)
ALBUMIN/GLOB SERPL: 1.9 G/DL
ALP SERPL-CCNC: 98 U/L (ref 30–99)
ALT SERPL-CCNC: 27 U/L (ref 2–50)
ANION GAP SERPL CALC-SCNC: 16 MMOL/L (ref 7–16)
APPEARANCE UR: CLEAR
APTT PPP: 23.9 SEC (ref 24.7–36)
AST SERPL-CCNC: 27 U/L (ref 12–45)
BASOPHILS # BLD AUTO: 0.9 % (ref 0–1.8)
BASOPHILS # BLD: 0.05 K/UL (ref 0–0.12)
BILIRUB SERPL-MCNC: 0.6 MG/DL (ref 0.1–1.5)
BILIRUB UR QL STRIP.AUTO: NEGATIVE
BUN SERPL-MCNC: 18 MG/DL (ref 8–22)
CALCIUM SERPL-MCNC: 10 MG/DL (ref 8.4–10.2)
CHLORIDE SERPL-SCNC: 102 MMOL/L (ref 96–112)
CO2 SERPL-SCNC: 22 MMOL/L (ref 20–33)
COLOR UR: YELLOW
CREAT SERPL-MCNC: 0.93 MG/DL (ref 0.5–1.4)
EKG IMPRESSION: NORMAL
EOSINOPHIL # BLD AUTO: 0.29 K/UL (ref 0–0.51)
EOSINOPHIL NFR BLD: 5.4 % (ref 0–6.9)
ERYTHROCYTE [DISTWIDTH] IN BLOOD BY AUTOMATED COUNT: 43.2 FL (ref 35.9–50)
GLOBULIN SER CALC-MCNC: 2.4 G/DL (ref 1.9–3.5)
GLUCOSE SERPL-MCNC: 123 MG/DL (ref 65–99)
GLUCOSE UR STRIP.AUTO-MCNC: NEGATIVE MG/DL
HCT VFR BLD AUTO: 42.9 % (ref 42–52)
HGB BLD-MCNC: 14.8 G/DL (ref 14–18)
IMM GRANULOCYTES # BLD AUTO: 0.01 K/UL (ref 0–0.11)
IMM GRANULOCYTES NFR BLD AUTO: 0.2 % (ref 0–0.9)
INR PPP: 1.02 (ref 0.87–1.13)
KETONES UR STRIP.AUTO-MCNC: NEGATIVE MG/DL
LACTATE BLD-SCNC: 2.2 MMOL/L (ref 0.5–2)
LEUKOCYTE ESTERASE UR QL STRIP.AUTO: NEGATIVE
LIPASE SERPL-CCNC: 24 U/L (ref 7–58)
LYMPHOCYTES # BLD AUTO: 1.95 K/UL (ref 1–4.8)
LYMPHOCYTES NFR BLD: 36.2 % (ref 22–41)
MCH RBC QN AUTO: 31 PG (ref 27–33)
MCHC RBC AUTO-ENTMCNC: 34.5 G/DL (ref 33.7–35.3)
MCV RBC AUTO: 89.9 FL (ref 81.4–97.8)
MICRO URNS: ABNORMAL
MONOCYTES # BLD AUTO: 0.61 K/UL (ref 0–0.85)
MONOCYTES NFR BLD AUTO: 11.3 % (ref 0–13.4)
NEUTROPHILS # BLD AUTO: 2.47 K/UL (ref 1.82–7.42)
NEUTROPHILS NFR BLD: 46 % (ref 44–72)
NITRITE UR QL STRIP.AUTO: NEGATIVE
NRBC # BLD AUTO: 0 K/UL
NRBC BLD-RTO: 0 /100 WBC
NT-PROBNP SERPL IA-MCNC: 53 PG/ML (ref 0–125)
PH UR STRIP.AUTO: 8.5 [PH] (ref 5–8)
PLATELET # BLD AUTO: 189 K/UL (ref 164–446)
PMV BLD AUTO: 10.3 FL (ref 9–12.9)
POTASSIUM SERPL-SCNC: 3.6 MMOL/L (ref 3.6–5.5)
PROT SERPL-MCNC: 7 G/DL (ref 6–8.2)
PROT UR QL STRIP: NEGATIVE MG/DL
PROTHROMBIN TIME: 12.6 SEC (ref 12–14.6)
RBC # BLD AUTO: 4.77 M/UL (ref 4.7–6.1)
RBC UR QL AUTO: NEGATIVE
SODIUM SERPL-SCNC: 140 MMOL/L (ref 135–145)
SP GR UR STRIP.AUTO: 1.01
TROPONIN T SERPL-MCNC: <6 NG/L (ref 6–19)
WBC # BLD AUTO: 5.4 K/UL (ref 4.8–10.8)

## 2022-03-13 PROCEDURE — 83605 ASSAY OF LACTIC ACID: CPT

## 2022-03-13 PROCEDURE — 93005 ELECTROCARDIOGRAM TRACING: CPT | Performed by: EMERGENCY MEDICINE

## 2022-03-13 PROCEDURE — 85610 PROTHROMBIN TIME: CPT

## 2022-03-13 PROCEDURE — 96374 THER/PROPH/DIAG INJ IV PUSH: CPT

## 2022-03-13 PROCEDURE — 84484 ASSAY OF TROPONIN QUANT: CPT

## 2022-03-13 PROCEDURE — 700111 HCHG RX REV CODE 636 W/ 250 OVERRIDE (IP): Performed by: EMERGENCY MEDICINE

## 2022-03-13 PROCEDURE — 700117 HCHG RX CONTRAST REV CODE 255: Performed by: EMERGENCY MEDICINE

## 2022-03-13 PROCEDURE — 700105 HCHG RX REV CODE 258: Performed by: EMERGENCY MEDICINE

## 2022-03-13 PROCEDURE — 85730 THROMBOPLASTIN TIME PARTIAL: CPT

## 2022-03-13 PROCEDURE — 36415 COLL VENOUS BLD VENIPUNCTURE: CPT

## 2022-03-13 PROCEDURE — 83690 ASSAY OF LIPASE: CPT

## 2022-03-13 PROCEDURE — 74175 CTA ABDOMEN W/CONTRAST: CPT

## 2022-03-13 PROCEDURE — 99285 EMERGENCY DEPT VISIT HI MDM: CPT

## 2022-03-13 PROCEDURE — 80053 COMPREHEN METABOLIC PANEL: CPT

## 2022-03-13 PROCEDURE — 96376 TX/PRO/DX INJ SAME DRUG ADON: CPT

## 2022-03-13 PROCEDURE — 96375 TX/PRO/DX INJ NEW DRUG ADDON: CPT

## 2022-03-13 PROCEDURE — 81003 URINALYSIS AUTO W/O SCOPE: CPT

## 2022-03-13 PROCEDURE — 83880 ASSAY OF NATRIURETIC PEPTIDE: CPT

## 2022-03-13 PROCEDURE — 71045 X-RAY EXAM CHEST 1 VIEW: CPT

## 2022-03-13 PROCEDURE — 85025 COMPLETE CBC W/AUTO DIFF WBC: CPT

## 2022-03-13 RX ORDER — SODIUM CHLORIDE, SODIUM LACTATE, POTASSIUM CHLORIDE, CALCIUM CHLORIDE 600; 310; 30; 20 MG/100ML; MG/100ML; MG/100ML; MG/100ML
1000 INJECTION, SOLUTION INTRAVENOUS ONCE
Status: COMPLETED | OUTPATIENT
Start: 2022-03-13 | End: 2022-03-13

## 2022-03-13 RX ORDER — METOCLOPRAMIDE HYDROCHLORIDE 5 MG/ML
10 INJECTION INTRAMUSCULAR; INTRAVENOUS ONCE
Status: COMPLETED | OUTPATIENT
Start: 2022-03-13 | End: 2022-03-13

## 2022-03-13 RX ORDER — HYDROMORPHONE HYDROCHLORIDE 1 MG/ML
1 INJECTION, SOLUTION INTRAMUSCULAR; INTRAVENOUS; SUBCUTANEOUS ONCE
Status: COMPLETED | OUTPATIENT
Start: 2022-03-13 | End: 2022-03-13

## 2022-03-13 RX ORDER — ONDANSETRON 4 MG/1
4 TABLET, ORALLY DISINTEGRATING ORAL EVERY 8 HOURS PRN
Qty: 8 TABLET | Refills: 0 | Status: SHIPPED | OUTPATIENT
Start: 2022-03-13 | End: 2022-03-20

## 2022-03-13 RX ADMIN — HYDROMORPHONE HYDROCHLORIDE 1 MG: 1 INJECTION, SOLUTION INTRAMUSCULAR; INTRAVENOUS; SUBCUTANEOUS at 04:42

## 2022-03-13 RX ADMIN — METOCLOPRAMIDE 10 MG: 5 INJECTION, SOLUTION INTRAMUSCULAR; INTRAVENOUS at 06:00

## 2022-03-13 RX ADMIN — METOCLOPRAMIDE 10 MG: 5 INJECTION, SOLUTION INTRAMUSCULAR; INTRAVENOUS at 04:41

## 2022-03-13 RX ADMIN — SODIUM CHLORIDE, POTASSIUM CHLORIDE, SODIUM LACTATE AND CALCIUM CHLORIDE 1000 ML: 600; 310; 30; 20 INJECTION, SOLUTION INTRAVENOUS at 04:45

## 2022-03-13 RX ADMIN — IOHEXOL 100 ML: 350 INJECTION, SOLUTION INTRAVENOUS at 04:38

## 2022-03-13 ASSESSMENT — FIBROSIS 4 INDEX: FIB4 SCORE: 1.63

## 2022-03-13 ASSESSMENT — PAIN DESCRIPTION - PAIN TYPE
TYPE: ACUTE PAIN
TYPE: ACUTE PAIN

## 2022-03-13 NOTE — DISCHARGE INSTRUCTIONS
Please discuss the following findings with your regular doctor:  Labs Reviewed   COMP METABOLIC PANEL - Abnormal; Notable for the following components:       Result Value    Glucose 123 (*)     All other components within normal limits    Narrative:     Indicate which anticoagulants the patient is on:->UNKNOWN   APTT - Abnormal; Notable for the following components:    APTT 23.9 (*)     All other components within normal limits    Narrative:     Indicate which anticoagulants the patient is on:->UNKNOWN   LACTIC ACID - Abnormal; Notable for the following components:    Lactic Acid 2.2 (*)     All other components within normal limits   CBC WITH DIFFERENTIAL    Narrative:     Indicate which anticoagulants the patient is on:->UNKNOWN   LIPASE    Narrative:     Indicate which anticoagulants the patient is on:->UNKNOWN   TROPONIN    Narrative:     Indicate which anticoagulants the patient is on:->UNKNOWN   PROBRAIN NATRIURETIC PEPTIDE, NT    Narrative:     Indicate which anticoagulants the patient is on:->UNKNOWN   PROTHROMBIN TIME    Narrative:     Indicate which anticoagulants the patient is on:->UNKNOWN   ESTIMATED GFR    Narrative:     Indicate which anticoagulants the patient is on:->UNKNOWN   URINALYSIS      DX-CHEST-PORTABLE (1 VIEW)   Final Result      No evidence of acute cardiopulmonary process.      CT-CTA COMPLETE THORACOABDOMINAL AORTA   Final Result   Addendum 1 of 1      3/13/2022 4:37 AM      HISTORY/REASON FOR EXAM:  Severe chest and back pain..         TECHNIQUE/EXAM DESCRIPTION:   CT angiogram without and with contrast, with reconstructions.      Initial precontrast thick helical sections were obtained from the top of    the aortic arch through the diaphragmatic domes. Following this,    thin-section postcontrast helical images were obtained from the lung    apices through the iliac crests following the    bolus administration of 100 mL of nonionic Omnipaque 350 contrast.  CT    angiographic technique  was utilized.      Parasagittal reconstructed images of the aorta were generated utilizing    multiplanar volume reformat technique.      Low dose optimization technique was utilized for this CT exam including    automated exposure control and adjustment of the mA and/or kV according to    patient size.      COMPARISON: None available.      FINDINGS:      Aorta: Pre-contrast images demonstrate no evidence of displaced calcified    intima or intramural hematoma.   Aortic arch: Branching pattern is conventional.   Diameter: There is ectasia of the ascending thoracic aorta measured at 3.9    x 3.8 cm in diameter. Descending thoracic aorta is normal in caliber.    There is atherosclerotic plaque present.   Dissection: Absent.   Celiac artery origin: Widely patent.   Superior mesenteric artery origin: Widely patent.   Renal artery origins: Widely patent.   Inferior mesenteric artery: Patent.   Common iliac arteries: Nonaneurysmal and patent.      Heart: Normal size. No pericardial effusion. Coronary artery origins are    conventional. There is atherosclerotic plaque involving the coronary    arteries.      3D angiographic/MIP images of the vasculature confirm the vascular    findings as described above.      Lungs: No opacity or pleural fluid identified.   Liver: Fatty change   Biliary system: No intrahepatic or extrahepatic ductal dilatation. The    gallbladder is grossly unremarkable.   Spleen: Unremarkable.   Adrenal glands: Unremarkable.   Pancreas: Unremarkable.   Kidneys: Symmetric enhancement. No solid mass is identified.   Bowel: Unremarkable. No pneumoperitoneum.   Ascites: None.   Lymph nodes: No adenopathy is identified.   Bones: Unremarkable.         1.  No evidence of aortic dissection.      2.  Ectasia of the ascending thoracic aorta measured 3.9 x 3.8 cm in    diameter.      3.  Atherosclerotic changes of the aorta and originating vessels to    include coronary arteries.      4.  Fatty liver.                      Final      1.  No evidence of aortic dissection.      2.  Ectasia of the ascending thoracic aorta measured 3.9 x 3.8 cm in diameter.      3.  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      4.  Fatty liver.

## 2022-03-13 NOTE — ED NOTES
MD notified of patients vomiting, and that he did not want further intervention. PER MD, patient is able to stay until they feel comfortable, or they can discharge now if they wish. Patient stated that he wanted to discharge. Discharge instructions given to patient. All questions and concerns addressed.

## 2022-03-13 NOTE — ED TRIAGE NOTES
Chief Complaint   Patient presents with   • Abdominal Pain     60 y/o male presents with a chief complaint of abdominal pain x45 minutes. Per patient, the pain was sudden onset, and he denied any known aggravating or relieving factors. Patient stated that his pain feels tearing. Patient denied any nausea, vomiting, diarrhea, fevers or chills.  Patient stated that he is having intermittent numbness, and tingling in his upper extremities.        BP (!) 165/107   Pulse 69   Temp (!) 35.6 °C (96 °F) (Temporal)   Resp (!) 22   Ht 1.829 m (6')   Wt 80.7 kg (177 lb 14.6 oz)   SpO2 100%   BMI 24.13 kg/m²     Patient is vaccinated against COVID 19.

## 2022-03-13 NOTE — ED NOTES
Patient had one episode of vomiting following second dose of Reglan. Patient denied wanting additional antiemetic medications.

## 2022-03-13 NOTE — ED PROVIDER NOTES
"ED Provider Note    Scribed for Juan Apodaca M.D. by Ant Segura. 3/13/2022  4:20 AM    Primary care provider: Siddhartha Vega M.D.  Means of arrival: Walk in  History obtained from: Patient  History limited by: None    CHIEF COMPLAINT  Chief Complaint   Patient presents with   • Abdominal Pain     62 y/o male presents with a chief complaint of abdominal pain x45 minutes. Per patient, the pain was sudden onset, and he denied any known aggravating or relieving factors. Patient stated that his pain feels tearing. Patient denied any nausea, vomiting, diarrhea, fevers or chills.  Patient stated that he is having intermittent numbness, and tingling in his upper extremities.        MARIO Cobb is a 61 y.o. male who presents to the Emergency Department for evaluation of moderate abdominal pain onset 45 minutes ago. The patient describes the pain as \"sharp\", \"stabbing\", and \"aching\". The patient localizes the pain to his epigastric area. The patient states that he was previously at a basketball game consuming food an alcohol before being awoken with worsening abdominal pain which prompted his visit to the ED today. The patient states that his alcohol consumption of 4 beers and 1 mixed drink which is typical of his weekly intake. The patient denies any previous episodes with similar symptoms. Associated symptoms include vomiting (2 episodes), intermittent bilateral hand numbness, and tingling.The patient denies any dysuria, recent trauma, chest pain, shortness of breath, syncopal episodes, or persistent lightheadedness. The patient notes a prior prostatectomy. The patient denies any prior history of heart attack. The patient denies any tobacco use, recreational drug use. The patient is unaware of any known drug allergies. The patient reports he is compliant with his daily Crestor 5 mg medication.     REVIEW OF SYSTEMS  Pertinent positives include: abdominal pain, vomiting (2 episodes), intermittent " bilateral hand numbness, and tingling. Pertinent negatives include: dysuria, recent trauma, chest pain, shortness of breath, syncopal episodes, or persistent lightheadedness. See history of present illness. All other systems are negative.     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    SURGICAL HISTORY   has a past surgical history that includes vein stripping and prostatectomy, radical retro (10/22/14).    SOCIAL HISTORY  Social History     Tobacco Use   • Smoking status: Never Smoker   • Smokeless tobacco: Never Used   Vaping Use   • Vaping Use: Never used   Substance Use Topics   • Alcohol use: Yes     Alcohol/week: 0.5 - 7.0 oz     Types: 1 - 14 Cans of beer per week   • Drug use: No      Social History     Substance and Sexual Activity   Drug Use No       FAMILY HISTORY  Family History   Problem Relation Age of Onset   • Heart Disease Father         CHF   • Stroke Father 60   • Hypertension Father    • Psychiatric Illness Paternal Uncle         Suicide   • Cancer Maternal Aunt         lung       CURRENT MEDICATIONS  Home Medications     Reviewed by Nu Pike R.N. (Registered Nurse) on 03/13/22 at 0452  Med List Status: <None>   Medication Last Dose Status   Eszopiclone (LUNESTA) 3 MG Tab  Active   rosuvastatin (CRESTOR) 5 MG Tab 3/12/2022 Active   valacyclovir (VALTREX) 1 GM Tab  Active                ALLERGIES  Allergies   Allergen Reactions   • Nkda [No Known Drug Allergy]        PHYSICAL EXAM  VITAL SIGNS: BP (!) 165/107   Pulse 69   Temp (!) 35.6 °C (96 °F) (Temporal)   Resp (!) 22   Ht 1.829 m (6')   Wt 80.7 kg (177 lb 14.6 oz)   SpO2 100%   BMI 24.13 kg/m²     Constitutional: Alert in no apparent distress.  HENT: No signs of trauma, Bilateral external ears normal, Nose normal. Uvula midline.   Eyes: Pupils are equal and reactive, Conjunctiva normal, Non-icteric.    Neck: Normal range of motion, No tenderness, Supple, No stridor.   Lymphatic: No lymphadenopathy noted.   Cardiovascular: Regular rate and rhythm, no murmurs.   Thorax & Lungs: Normal breath sounds, No respiratory distress, No wheezing, No chest tenderness.   Abdomen:  Soft, No tenderness, No peritoneal signs, No masses, No pulsatile masses.   Skin: Warm, Dry, No erythema, No rash.   Back: No bony tenderness, No CVA tenderness.   Extremities: Intact distal pulses, No edema, No tenderness, No cyanosis.  Musculoskeletal: Good range of motion in all major joints. No tenderness to palpation or major deformities noted.   Neurologic: Alert , Normal motor function, Normal sensory function, No focal deficits noted.   Psychiatric: Affect normal, Judgment normal, Mood normal.     DIAGNOSTIC STUDIES / PROCEDURES    LABS  Labs Reviewed   COMP METABOLIC PANEL - Abnormal; Notable for the following components:       Result Value    Glucose 123 (*)     All other components within normal limits    Narrative:     Indicate which anticoagulants the patient is on:->UNKNOWN   URINALYSIS - Abnormal; Notable for the following components:    Ph 8.5 (*)     All other components within normal limits   APTT - Abnormal; Notable for the following components:    APTT 23.9 (*)     All other components within normal limits    Narrative:     Indicate which anticoagulants the patient is on:->UNKNOWN   LACTIC ACID - Abnormal; Notable for the following components:    Lactic Acid 2.2 (*)     All other components within normal limits   CBC WITH DIFFERENTIAL    Narrative:     Indicate which anticoagulants the patient is on:->UNKNOWN   LIPASE    Narrative:     Indicate which anticoagulants the patient is on:->UNKNOWN   TROPONIN    Narrative:     Indicate which anticoagulants the patient is on:->UNKNOWN   PROBRAIN NATRIURETIC PEPTIDE, NT    Narrative:     Indicate which anticoagulants the patient is on:->UNKNOWN   PROTHROMBIN TIME    Narrative:      Indicate which anticoagulants the patient is on:->UNKNOWN   ESTIMATED GFR    Narrative:     Indicate which anticoagulants the patient is on:->UNKNOWN      All labs reviewed by me.    EKG   Report   Date Value Ref Range Status   2022       [facility] Emergency Dept.    Test Date:  2022  Pt Name:    CLIFF RODRIGUEZ                 Department: [facility]  MRN:        [id number]                      [facility]  Gender:     Male                         Technician: [id number]  :        1960                   Requested By:TULIO APODACA  Order #:    [id number]                    Reading MD: Tulio Apodaca MD    Measurements  Intervals                                Axis  Rate:       56                           P:          36  MO:         252                          QRS:        36  QRSD:       98                           T:          46  QT:         458  QTc:        443    Interpretive Statements  Sinus bradycardia  Prolonged MO interval  Low voltage, precordial leads  No previous ECG available for comparison  Electronically Signed On 3- 5:23:51 PDT by Tulio Apodaca MD               12 Lead EKG interpreted by me, as detailed above    RADIOLOGY  DX-CHEST-PORTABLE (1 VIEW)   Final Result      No evidence of acute cardiopulmonary process.      CT-CTA COMPLETE THORACOABDOMINAL AORTA   Final Result   Addendum 1 of 1      3/13/2022 4:37 AM      HISTORY/REASON FOR EXAM:  Severe chest and back pain..         TECHNIQUE/EXAM DESCRIPTION:   CT angiogram without and with contrast, with reconstructions.      Initial precontrast thick helical sections were obtained from the top of    the aortic arch through the diaphragmatic domes. Following this,    thin-section postcontrast helical images were obtained from the lung    apices through the iliac crests following the    bolus administration of 100 mL of nonionic Omnipaque 350 contrast.  CT    angiographic technique was utilized.       Parasagittal reconstructed images of the aorta were generated utilizing    multiplanar volume reformat technique.      Low dose optimization technique was utilized for this CT exam including    automated exposure control and adjustment of the mA and/or kV according to    patient size.      COMPARISON: None available.      FINDINGS:      Aorta: Pre-contrast images demonstrate no evidence of displaced calcified    intima or intramural hematoma.   Aortic arch: Branching pattern is conventional.   Diameter: There is ectasia of the ascending thoracic aorta measured at 3.9    x 3.8 cm in diameter. Descending thoracic aorta is normal in caliber.    There is atherosclerotic plaque present.   Dissection: Absent.   Celiac artery origin: Widely patent.   Superior mesenteric artery origin: Widely patent.   Renal artery origins: Widely patent.   Inferior mesenteric artery: Patent.   Common iliac arteries: Nonaneurysmal and patent.      Heart: Normal size. No pericardial effusion. Coronary artery origins are    conventional. There is atherosclerotic plaque involving the coronary    arteries.      3D angiographic/MIP images of the vasculature confirm the vascular    findings as described above.      Lungs: No opacity or pleural fluid identified.   Liver: Fatty change   Biliary system: No intrahepatic or extrahepatic ductal dilatation. The    gallbladder is grossly unremarkable.   Spleen: Unremarkable.   Adrenal glands: Unremarkable.   Pancreas: Unremarkable.   Kidneys: Symmetric enhancement. No solid mass is identified.   Bowel: Unremarkable. No pneumoperitoneum.   Ascites: None.   Lymph nodes: No adenopathy is identified.   Bones: Unremarkable.         1.  No evidence of aortic dissection.      2.  Ectasia of the ascending thoracic aorta measured 3.9 x 3.8 cm in    diameter.      3.  Atherosclerotic changes of the aorta and originating vessels to    include coronary arteries.      4.  Fatty liver.                     Final       1.  No evidence of aortic dissection.      2.  Ectasia of the ascending thoracic aorta measured 3.9 x 3.8 cm in diameter.      3.  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      4.  Fatty liver.                          The radiologist's interpretation of all radiological studies have been reviewed by me.    COURSE & MEDICAL DECISION MAKING  Nursing notes, VS, PMSFHx reviewed in chart.    61 y.o. male p/w chief complaint of moderate abdominal pain onset 45 minutes ago.    The differential diagnoses include but are not limited to:   #abdominal pain    CT scan of abdomen ordered in order to rule out aortic dissection    Mild RLQ pain, TTP, pt counseled to return in 24 hr if sx persist or worsen.   No LLQ pain or TTP or fever to suggest diverticulitis  No pain at of proportion to suggest mesenteric ischemia  No e/o rash or zoster      No e/o UTI  No elevation in lipase to suggest pancreatitis  ECG unremarkable and no chest pain to suggest intrathoracic etiology of abd pain      4:20 AM Patient seen and examined at bedside. Ordered EKG, UA, Lipase, CMP, CBC with diff, Prothrombin Time (INR), APTT (PTT), pBNP, Troponin, CT-CTA Complete Thoracoabdominal Aorta, and DX-Chest to evaluate the patient. The patient will be treated with Lactated Ringer (LR) Bolus, Reglan 10 mg, and Dilaudid 1 mg.    4:22 AM - Patient taken urgently to CT scan for code Aorta.     4:37 AM - Nursing staff informed me that the patient began vomiting as soon as he was placed on the table for his CT scan and reports feeling slightly improved.    4:42 PM - No obvious aortic dissection seen by me on the patient's CT scan.     5:27 AM - Patient was reevaluated at bedside. The patient reports feeling greatly improved compared to how he felt when initially visiting the ED. Discussed lab and radiology results with the patient and informed them that they were largely unremarkable. I advised them that they  are likely to experience more vomiting episodes. I also explained that the patient should be observant for any diarrhea or dark colored stools for the near future. I explained that the patient's symptom's are not necessarily due to food poisoning and explained that the patient's symptoms can possibly stem from a viral infection. Additionally, I explained to the patient that while they had an abnormal EKG it can be attributed to the patient's physiological build and state of athleticism as a possible confounding factor, regardless I urged them to inform their PCP of these findings for follow-up care.    5:41 AM - The patient informed me that they are still feeling nauseous and are very close to experiencing another vomiting episode. Ordered Reglan 10 mg to treat the patient.       Pt tolerating PO prior to ED departure. Plan for return if sx persist or worsen.     I verified that the patient was wearing a mask and I was wearing appropriate PPE every time I entered the room. The patient's mask was on the patient at all times during my encounter except for a brief view of the oropharynx.     The patient is referred to a primary physician for blood pressure management, diabetic screening, and for all other preventative health concerns.        DISPOSITION:  Patient will be discharged home in stable condition.    FOLLOW UP:  Siddhartha Vega M.D.  [address] [place] [address] [address] [id number] [phone]    In 3 days      [facility], Emergency Dept  [address]  [facility] [id number] [phone]    If symptoms worsen      OUTPATIENT MEDICATIONS:  Discharge Medication List as of 3/13/2022  6:30 AM      START taking these medications    Details   ondansetron (ZOFRAN ODT) 4 MG TABLET DISPERSIBLE Take 1 Tablet by mouth every 8 hours as needed for Nausea for up to 7 days., Disp-8 Tablet, R-0, Normal               FINAL IMPRESSION  1. Acute abdominal pain    2. Non-intractable vomiting with nausea,  unspecified vomiting type    3. Prolonged P-R interval          Ant COELHO (Scribe), am scribing for, and in the presence of, Juan Apodaca M.D..    Electronically signed by: Ant Segura (Scribe), 3/13/2022 C    Juan COELHO M.D. personally performed the services described in this documentation, as scribed by Ant Segura in my presence, and it is both accurate and complete.    The note accurately reflects work and decisions made by me.  Juan Apodaca M.D.  3/13/2022  6:18 AM

## 2022-04-06 ENCOUNTER — OFFICE VISIT (OUTPATIENT)
Dept: MEDICAL GROUP | Age: 62
End: 2022-04-06
Payer: COMMERCIAL

## 2022-04-06 VITALS
BODY MASS INDEX: 23.4 KG/M2 | OXYGEN SATURATION: 98 % | RESPIRATION RATE: 16 BRPM | HEIGHT: 72 IN | TEMPERATURE: 98.6 F | DIASTOLIC BLOOD PRESSURE: 68 MMHG | HEART RATE: 53 BPM | SYSTOLIC BLOOD PRESSURE: 92 MMHG | WEIGHT: 172.8 LBS

## 2022-04-06 DIAGNOSIS — R00.1 SINUS BRADYCARDIA: ICD-10-CM

## 2022-04-06 DIAGNOSIS — Z91.89 OTHER SPECIFIED PERSONAL RISK FACTORS, NOT ELSEWHERE CLASSIFIED: ICD-10-CM

## 2022-04-06 DIAGNOSIS — E78.5 DYSLIPIDEMIA: ICD-10-CM

## 2022-04-06 DIAGNOSIS — I70.0 ATHEROSCLEROSIS OF AORTA (HCC): ICD-10-CM

## 2022-04-06 DIAGNOSIS — Z09 HOSPITAL DISCHARGE FOLLOW-UP: ICD-10-CM

## 2022-04-06 PROCEDURE — 99214 OFFICE O/P EST MOD 30 MIN: CPT | Performed by: FAMILY MEDICINE

## 2022-04-06 RX ORDER — ROSUVASTATIN CALCIUM 20 MG/1
20 TABLET, COATED ORAL EVERY EVENING
Qty: 90 TABLET | Refills: 2 | Status: SHIPPED | OUTPATIENT
Start: 2022-04-06 | End: 2022-11-17 | Stop reason: SDUPTHER

## 2022-04-06 ASSESSMENT — FIBROSIS 4 INDEX: FIB4 SCORE: 1.649572197684645042

## 2022-04-06 ASSESSMENT — PATIENT HEALTH QUESTIONNAIRE - PHQ9: CLINICAL INTERPRETATION OF PHQ2 SCORE: 0

## 2022-04-06 NOTE — PROGRESS NOTES
This medical record contains text that has been entered with the assistance of computer voice recognition and dictation software.  Therefore, it may contain unintended errors in text, spelling, punctuation, or grammar      Chief Complaint   Patient presents with   • Results         Joel Cobb is a 61 y.o. male here evaluation and management of: Follow-up      HPI:     1. Hospital discharge follow-up  2. Dyslipidemia  3. Sinus bradycardia  4. Other specified personal risk factors, not elsewhere classified  5. Atherosclerosis of aorta (HCC)     is a very pleasant and athletic 61-year-old male who presented to the emergency room on March 13 with moderate abdominal pain.  He describes the pain as starting 45 minutes prior to his arrival is very sharp or stabbing aching and is localized to the epigastric region.  The patient had been at the [employer] tournament in [place] where he was drinking food and alcohol enjoying the basketball tournament.  He woke up because of the abdominal pain which led to the arrival in the emergency room.  He did not have any recent trauma chest pain shortness of breath syncopal episodes no dyspnea on exertion.  He does have a past medical history of prostatectomy due to prostate cancer.  The patient is extremely healthy has been running track since high school still runs 30 miles per week so he often has resting bradycardia.  EKG revealed sinus bradycardia prolonged OK interval low voltage in precordial leads.  CT angiogram revealed ectasia of the ascending thoracic aorta 3.9 x 3.8 cm in diameter.    FINDINGS:       Aorta: Pre-contrast images demonstrate no evidence of displaced calcified    intima or intramural hematoma.   Aortic arch: Branching pattern is conventional.   Diameter: There is ectasia of the ascending thoracic aorta measured at 3.9    x 3.8 cm in diameter. Descending thoracic aorta is normal in caliber.    There is atherosclerotic plaque present.   Dissection:  Absent.   Celiac artery origin: Widely patent.   Superior mesenteric artery origin: Widely patent.   Renal artery origins: Widely patent.   Inferior mesenteric artery: Patent.   Common iliac arteries: Nonaneurysmal and patent.       Heart: Normal size. No pericardial effusion. Coronary artery origins are    conventional. There is atherosclerotic plaque involving the coronary    arteries.         Current medicines (including changes today)  Current Outpatient Medications   Medication Sig Dispense Refill   • rosuvastatin (CRESTOR) 20 MG Tab Take 1 Tablet by mouth every evening. 90 Tablet 2   • rosuvastatin (CRESTOR) 5 MG Tab TAKE 1 TABLET BY MOUTH EVERY DAY IN THE EVENING 90 Tablet 3   • valacyclovir (VALTREX) 1 GM Tab Take 0.5 Tablets by mouth 3 times a day. For 5-7 days prn rash. 135 tablet 3   • Eszopiclone (LUNESTA) 3 MG Tab Take 1 Tab by mouth at bedtime as needed. 90 Tab 0     No current facility-administered medications for this visit.     He  has a past medical history of Cervical radiculopathy at C7 (10/10/2015), HSV-2 infection (10/19/2010), Hyperlipemia, and Jet lag (10/19/2010).  He  has a past surgical history that includes vein stripping and prostatectomy, radical retro (10/22/14).  Social History     Tobacco Use   • Smoking status: Never Smoker   • Smokeless tobacco: Never Used   Vaping Use   • Vaping Use: Never used   Substance Use Topics   • Alcohol use: Yes     Alcohol/week: 0.5 - 7.0 oz     Types: 1 - 14 Cans of beer per week   • Drug use: No     Social History     Social History Narrative    . Travels to [place], [place], [place] several times per year.      Family History   Problem Relation Age of Onset   • Heart Disease Father         CHF   • Stroke Father 60   • Hypertension Father    • Psychiatric Illness Paternal Uncle         Suicide   • Cancer Maternal Aunt         lung     Family Status   Relation Name Status   • Fa   at age 69        CHF, emphysema   • Mo  Alive   • [facility]  (Not  Specified)   • MAunt  (Not Specified)         ROS    The pertinent  ROS findings can be seen in the HPI above.     All other systems reviewed and are negative     Objective:     BP (!) 92/68 (BP Location: Right arm, Patient Position: Sitting, BP Cuff Size: Adult)   Pulse (!) 53   Temp 37 °C (98.6 °F) (Temporal)   Resp 16   Ht 1.829 m (6')   Wt 78.4 kg (172 lb 12.8 oz)   SpO2 98%  Body mass index is 23.44 kg/m².      Physical Exam:    Constitutional: Alert, no distress.  Skin: No suspicious lesions  Eye: Equal, round and reactive, conjunctiva clear, lids normal.  ENMT: Lips without lesions, good dentition, oropharynx clear.  Neck: Trachea midline, no masses, no thyromegaly. No cervical or supraclavicular lymphadenopathy.  Respiratory: Unlabored respiratory effort, lungs clear to auscultation, no wheezes, no ronchi.  Cardiovascular: Normal S1, S2, no murmur, no edema  Abdomen: Soft, non-tender, no masses, no hepatosplenomegaly.        Assessment and Plan:   The following treatment plan was discussed      1. Hospital discharge follow-up  2. Dyslipidemia  3. Sinus bradycardia  4. Other specified personal risk factors, not elsewhere classified    The patient will benefit from increasing statin therapy to moderate intensity statin therapy.  His ASCVD score is 3.3% we will ask cardiology if this patient will benefit from prophylactic baby aspirin.  We will obtain more information with a coronary calcium score.    - CT-CARDIAC SCORING; Future    - rosuvastatin (CRESTOR) 20 MG Tab; Take 1 Tablet by mouth every evening.  Dispense: 90 Tablet; Refill: 2  - REFERRAL TO CARDIOLOGY  The 10-year ASCVD risk score ([place] DC Jr., et al., 2013) is: 3.3%          Instructed to Follow up in clinic or ER for worsening symptoms, difficulty breathing, lack of expected recovery, or should new symptoms or problems arise.    Followup: Return in about 3 months (around 7/6/2022) for Reevaluation.

## 2022-04-07 PROBLEM — I70.0 ATHEROSCLEROSIS OF AORTA (HCC): Status: ACTIVE | Noted: 2022-04-07

## 2022-04-19 ENCOUNTER — HOSPITAL ENCOUNTER (OUTPATIENT)
Dept: RADIOLOGY | Facility: MEDICAL CENTER | Age: 62
End: 2022-04-19
Attending: FAMILY MEDICINE
Payer: COMMERCIAL

## 2022-04-19 DIAGNOSIS — Z91.89 OTHER SPECIFIED PERSONAL RISK FACTORS, NOT ELSEWHERE CLASSIFIED: ICD-10-CM

## 2022-04-19 PROCEDURE — 4410556 CT-CARDIAC SCORING (SELF PAY ONLY)

## 2022-05-17 ENCOUNTER — TELEPHONE (OUTPATIENT)
Dept: CARDIOLOGY | Facility: MEDICAL CENTER | Age: 62
End: 2022-05-17
Payer: COMMERCIAL

## 2022-05-17 NOTE — TELEPHONE ENCOUNTER
Called patient to request records for NP appointment with   Called to confirm if this will be first time patient is seeing a cardiologist. No answer, left voicemail to call back.

## 2022-05-20 NOTE — TELEPHONE ENCOUNTER
called back. No prior cardio. He has scoring and blood work in My Chart.    Thank you,  Susi BELLO

## 2022-05-24 ENCOUNTER — OFFICE VISIT (OUTPATIENT)
Dept: CARDIOLOGY | Facility: MEDICAL CENTER | Age: 62
End: 2022-05-24
Attending: FAMILY MEDICINE
Payer: COMMERCIAL

## 2022-05-24 VITALS
HEART RATE: 63 BPM | BODY MASS INDEX: 23.7 KG/M2 | HEIGHT: 72 IN | DIASTOLIC BLOOD PRESSURE: 60 MMHG | WEIGHT: 175 LBS | RESPIRATION RATE: 16 BRPM | OXYGEN SATURATION: 98 % | SYSTOLIC BLOOD PRESSURE: 110 MMHG

## 2022-05-24 DIAGNOSIS — R93.1 AGATSTON CAC SCORE, >400: ICD-10-CM

## 2022-05-24 DIAGNOSIS — I70.0 ATHEROSCLEROSIS OF AORTA (HCC): ICD-10-CM

## 2022-05-24 DIAGNOSIS — C61 PROSTATE CARCINOMA (HCC): ICD-10-CM

## 2022-05-24 DIAGNOSIS — E78.5 DYSLIPIDEMIA: ICD-10-CM

## 2022-05-24 DIAGNOSIS — R00.1 SINUS BRADYCARDIA: ICD-10-CM

## 2022-05-24 DIAGNOSIS — Z01.810 PRE-OPERATIVE CARDIOVASCULAR EXAMINATION: ICD-10-CM

## 2022-05-24 LAB — EKG IMPRESSION: NORMAL

## 2022-05-24 PROCEDURE — 99205 OFFICE O/P NEW HI 60 MIN: CPT | Mod: 25 | Performed by: INTERNAL MEDICINE

## 2022-05-24 PROCEDURE — 93000 ELECTROCARDIOGRAM COMPLETE: CPT | Performed by: INTERNAL MEDICINE

## 2022-05-24 ASSESSMENT — ENCOUNTER SYMPTOMS
COUGH: 0
PND: 0
FEVER: 0
PALPITATIONS: 0
MUSCULOSKELETAL NEGATIVE: 1
ORTHOPNEA: 0
NEUROLOGICAL NEGATIVE: 1
SPUTUM PRODUCTION: 0
EYES NEGATIVE: 1
CARDIOVASCULAR NEGATIVE: 1
LOSS OF CONSCIOUSNESS: 0
SHORTNESS OF BREATH: 0
DIZZINESS: 0
WHEEZING: 0
STRIDOR: 0
CONSTITUTIONAL NEGATIVE: 1
WEAKNESS: 0
HEMOPTYSIS: 0
SORE THROAT: 0
GASTROINTESTINAL NEGATIVE: 1
CHILLS: 0
BRUISES/BLEEDS EASILY: 0
CLAUDICATION: 0
RESPIRATORY NEGATIVE: 1

## 2022-05-24 ASSESSMENT — FIBROSIS 4 INDEX: FIB4 SCORE: 1.649572197684645042

## 2022-05-24 NOTE — PROGRESS NOTES
Chief Complaint   Patient presents with   • Dyslipidemia   • Aortic Atherosclerosis       Subjective     Joel Cobb is a 61 y.o. male who presents today as a new consultation and preop for a colonoscopy.  He also has an elevated calcium score in the 1300 range.  His calcium score is 90 percentile for age gender and ethnicity.  He is on a statin.  He is never had other markers of atherosclerosis checked.  He has no chest pain or shortness of breath.  He is very highly active.  Because of his sinus bradycardia he was supposed to have a colonoscopy and it was canceled due to that.    Past Medical History:   Diagnosis Date   • Cervical radiculopathy at C7 10/10/2015   • HSV-2 infection 10/19/2010   • Hyperlipemia    • Jet lag 10/19/2010     Past Surgical History:   Procedure Laterality Date   • PROSTATECTOMY, RADICAL RETRO  10/22/14    MD Elder   • VEIN STRIPPING      bilateral leg varicose veins     Family History   Problem Relation Age of Onset   • Heart Disease Father         CHF   • Stroke Father 60   • Hypertension Father    • Psychiatric Illness Paternal Uncle         Suicide   • Cancer Maternal Aunt         lung     Social History     Socioeconomic History   • Marital status:      Spouse name: Not on file   • Number of children: 0   • Years of education: Not on file   • Highest education level: Not on file   Occupational History   • Occupation:      Employer: OTHER     Comment: [employer]   Tobacco Use   • Smoking status: Never Smoker   • Smokeless tobacco: Never Used   Vaping Use   • Vaping Use: Never used   Substance and Sexual Activity   • Alcohol use: Yes     Alcohol/week: 0.5 - 7.0 oz     Types: 1 - 14 Cans of beer per week   • Drug use: No   • Sexual activity: Yes     Partners: Female   Other Topics Concern   • Not on file   Social History Narrative    . Travels to [place], [place], [place] several times per year.      Social Determinants of Health     Financial Resource Strain: Not on  file   Food Insecurity: Not on file   Transportation Needs: Not on file   Physical Activity: Not on file   Stress: Not on file   Social Connections: Not on file   Intimate Partner Violence: Not on file   Housing Stability: Not on file     Allergies   Allergen Reactions   • Nkda [No Known Drug Allergy]      Outpatient Encounter Medications as of 5/24/2022   Medication Sig Dispense Refill   • rosuvastatin (CRESTOR) 20 MG Tab Take 1 Tablet by mouth every evening. 90 Tablet 2   • valacyclovir (VALTREX) 1 GM Tab Take 0.5 Tablets by mouth 3 times a day. For 5-7 days prn rash. 135 tablet 3   • Eszopiclone (LUNESTA) 3 MG Tab Take 1 Tab by mouth at bedtime as needed. 90 Tab 0   • [DISCONTINUED] rosuvastatin (CRESTOR) 5 MG Tab TAKE 1 TABLET BY MOUTH EVERY DAY IN THE EVENING 90 Tablet 3     No facility-administered encounter medications on file as of 5/24/2022.     Review of Systems   Constitutional: Negative.  Negative for chills, fever and malaise/fatigue.   HENT: Negative.  Negative for sore throat.    Eyes: Negative.    Respiratory: Negative.  Negative for cough, hemoptysis, sputum production, shortness of breath, wheezing and stridor.    Cardiovascular: Negative.  Negative for chest pain, palpitations, orthopnea, claudication, leg swelling and PND.   Gastrointestinal: Negative.    Genitourinary: Negative.    Musculoskeletal: Negative.    Skin: Negative.    Neurological: Negative.  Negative for dizziness, loss of consciousness and weakness.   Endo/Heme/Allergies: Negative.  Does not bruise/bleed easily.   All other systems reviewed and are negative.             Objective     /60 (BP Location: Left arm, Patient Position: Sitting, BP Cuff Size: Adult)   Pulse 63   Resp 16   Ht 1.829 m (6')   Wt 79.4 kg (175 lb)   SpO2 98%   BMI 23.73 kg/m²     Physical Exam  Vitals and nursing note reviewed.   Constitutional:       General: He is not in acute distress.     Appearance: He is well-developed. He is not diaphoretic.    HENT:      Head: Normocephalic and atraumatic.      Right Ear: External ear normal.      Left Ear: External ear normal.      Nose: Nose normal.      Mouth/Throat:      Pharynx: No oropharyngeal exudate.   Eyes:      General: No scleral icterus.        Right eye: No discharge.         Left eye: No discharge.      Conjunctiva/sclera: Conjunctivae normal.      Pupils: Pupils are equal, round, and reactive to light.   Neck:      Vascular: No JVD.   Cardiovascular:      Rate and Rhythm: Normal rate and regular rhythm.      Heart sounds: No murmur heard.    No friction rub. No gallop.   Pulmonary:      Effort: Pulmonary effort is normal. No respiratory distress.      Breath sounds: No stridor. No wheezing or rales.   Chest:      Chest wall: No tenderness.   Abdominal:      General: There is no distension.      Palpations: Abdomen is soft.      Tenderness: There is no guarding.   Musculoskeletal:         General: No tenderness or deformity. Normal range of motion.      Cervical back: Neck supple.   Skin:     General: Skin is warm and dry.      Coloration: Skin is not pale.      Findings: No erythema or rash.   Neurological:      Mental Status: He is alert.      Cranial Nerves: No cranial nerve deficit.      Motor: No abnormal muscle tone.      Coordination: Coordination normal.      Deep Tendon Reflexes: Reflexes are normal and symmetric. Reflexes normal.   Psychiatric:         Behavior: Behavior normal.         Thought Content: Thought content normal.         Judgment: Judgment normal.                Assessment & Plan     1. Dyslipidemia  EKG   2. Atherosclerosis of aorta (HCC)  Comp Metabolic Panel    Lipid Profile    Lipoprotein (a)    HOMOCYSTEINE   3. Sinus bradycardia  Comp Metabolic Panel    Lipid Profile    Lipoprotein (a)    HOMOCYSTEINE   4. Pre-operative cardiovascular examination  Comp Metabolic Panel    Lipid Profile    Lipoprotein (a)    HOMOCYSTEINE   5. Agatston CAC score, >400  Comp Metabolic Panel     Lipid Profile    Lipoprotein (a)    HOMOCYSTEINE       Medical Decision Making: Today's Assessment/Status/Plan:        61-year-old male with a high calcium score and resting bradycardia.  He is no higher risk for his colonoscopy and he can have this with no further testing needed.  For his high calcium score he will stay on the statin.  We will check a metabolic prep panel lipoprotein a level and homocysteine level.  I will see him back in 3 months.

## 2022-06-10 ENCOUNTER — HOSPITAL ENCOUNTER (OUTPATIENT)
Dept: LAB | Facility: MEDICAL CENTER | Age: 62
End: 2022-06-10
Attending: INTERNAL MEDICINE
Payer: COMMERCIAL

## 2022-06-10 ENCOUNTER — HOSPITAL ENCOUNTER (OUTPATIENT)
Dept: LAB | Facility: MEDICAL CENTER | Age: 62
End: 2022-06-10
Attending: FAMILY MEDICINE
Payer: COMMERCIAL

## 2022-06-10 DIAGNOSIS — Z01.810 PRE-OPERATIVE CARDIOVASCULAR EXAMINATION: ICD-10-CM

## 2022-06-10 DIAGNOSIS — C61 PROSTATE CARCINOMA (HCC): ICD-10-CM

## 2022-06-10 DIAGNOSIS — I70.0 ATHEROSCLEROSIS OF AORTA (HCC): ICD-10-CM

## 2022-06-10 DIAGNOSIS — R93.1 AGATSTON CAC SCORE, >400: ICD-10-CM

## 2022-06-10 DIAGNOSIS — R00.1 SINUS BRADYCARDIA: ICD-10-CM

## 2022-06-10 LAB
ALBUMIN SERPL BCP-MCNC: 4.5 G/DL (ref 3.2–4.9)
ALBUMIN/GLOB SERPL: 2 G/DL
ALP SERPL-CCNC: 71 U/L (ref 30–99)
ALT SERPL-CCNC: 35 U/L (ref 2–50)
ANION GAP SERPL CALC-SCNC: 9 MMOL/L (ref 7–16)
AST SERPL-CCNC: 30 U/L (ref 12–45)
BILIRUB SERPL-MCNC: 1.4 MG/DL (ref 0.1–1.5)
BUN SERPL-MCNC: 15 MG/DL (ref 8–22)
CALCIUM SERPL-MCNC: 8.7 MG/DL (ref 8.5–10.5)
CHLORIDE SERPL-SCNC: 105 MMOL/L (ref 96–112)
CHOLEST SERPL-MCNC: 129 MG/DL (ref 100–199)
CO2 SERPL-SCNC: 26 MMOL/L (ref 20–33)
CREAT SERPL-MCNC: 0.73 MG/DL (ref 0.5–1.4)
FASTING STATUS PATIENT QL REPORTED: NORMAL
GFR SERPLBLD CREATININE-BSD FMLA CKD-EPI: 104 ML/MIN/1.73 M 2
GLOBULIN SER CALC-MCNC: 2.3 G/DL (ref 1.9–3.5)
GLUCOSE SERPL-MCNC: 99 MG/DL (ref 65–99)
HCYS SERPL-SCNC: 8.61 UMOL/L
HDLC SERPL-MCNC: 66 MG/DL
LDLC SERPL CALC-MCNC: 51 MG/DL
POTASSIUM SERPL-SCNC: 4.5 MMOL/L (ref 3.6–5.5)
PROT SERPL-MCNC: 6.8 G/DL (ref 6–8.2)
PSA SERPL-MCNC: <0.02 NG/ML (ref 0–4)
SODIUM SERPL-SCNC: 140 MMOL/L (ref 135–145)
TRIGL SERPL-MCNC: 59 MG/DL (ref 0–149)

## 2022-06-10 PROCEDURE — 36415 COLL VENOUS BLD VENIPUNCTURE: CPT

## 2022-06-10 PROCEDURE — 83090 ASSAY OF HOMOCYSTEINE: CPT

## 2022-06-10 PROCEDURE — 83695 ASSAY OF LIPOPROTEIN(A): CPT

## 2022-06-10 PROCEDURE — 84153 ASSAY OF PSA TOTAL: CPT

## 2022-06-10 PROCEDURE — 84403 ASSAY OF TOTAL TESTOSTERONE: CPT

## 2022-06-10 PROCEDURE — 80053 COMPREHEN METABOLIC PANEL: CPT

## 2022-06-10 PROCEDURE — 84270 ASSAY OF SEX HORMONE GLOBUL: CPT

## 2022-06-10 PROCEDURE — 84402 ASSAY OF FREE TESTOSTERONE: CPT

## 2022-06-10 PROCEDURE — 80061 LIPID PANEL: CPT

## 2022-06-14 LAB
LPA SERPL-MCNC: <6 MG/DL
SHBG SERPL-SCNC: 63 NMOL/L (ref 19–76)
TESTOST FREE MFR SERPL: 1.2 % (ref 1.6–2.9)
TESTOST FREE SERPL-MCNC: 38 PG/ML (ref 47–244)
TESTOST SERPL-MCNC: 319 NG/DL (ref 300–720)

## 2022-11-17 ENCOUNTER — OFFICE VISIT (OUTPATIENT)
Dept: CARDIOLOGY | Facility: MEDICAL CENTER | Age: 62
End: 2022-11-17
Payer: COMMERCIAL

## 2022-11-17 VITALS
RESPIRATION RATE: 16 BRPM | DIASTOLIC BLOOD PRESSURE: 70 MMHG | WEIGHT: 176 LBS | HEIGHT: 72 IN | OXYGEN SATURATION: 99 % | SYSTOLIC BLOOD PRESSURE: 102 MMHG | BODY MASS INDEX: 23.84 KG/M2 | HEART RATE: 53 BPM

## 2022-11-17 DIAGNOSIS — N52.9 ERECTILE DYSFUNCTION, UNSPECIFIED ERECTILE DYSFUNCTION TYPE: ICD-10-CM

## 2022-11-17 DIAGNOSIS — R93.1 AGATSTON CAC SCORE, >400: ICD-10-CM

## 2022-11-17 DIAGNOSIS — R00.1 SINUS BRADYCARDIA: ICD-10-CM

## 2022-11-17 DIAGNOSIS — I70.0 ATHEROSCLEROSIS OF AORTA (HCC): ICD-10-CM

## 2022-11-17 DIAGNOSIS — E78.5 DYSLIPIDEMIA: ICD-10-CM

## 2022-11-17 DIAGNOSIS — Z01.810 PRE-OPERATIVE CARDIOVASCULAR EXAMINATION: ICD-10-CM

## 2022-11-17 PROCEDURE — 99214 OFFICE O/P EST MOD 30 MIN: CPT | Performed by: INTERNAL MEDICINE

## 2022-11-17 RX ORDER — ROSUVASTATIN CALCIUM 20 MG/1
20 TABLET, COATED ORAL EVERY EVENING
Qty: 90 TABLET | Refills: 3 | Status: SHIPPED | OUTPATIENT
Start: 2022-11-17 | End: 2023-03-03 | Stop reason: SDUPTHER

## 2022-11-17 ASSESSMENT — ENCOUNTER SYMPTOMS
CHILLS: 0
ORTHOPNEA: 0
HEMOPTYSIS: 0
PALPITATIONS: 0
STRIDOR: 0
LOSS OF CONSCIOUSNESS: 0
MUSCULOSKELETAL NEGATIVE: 1
DIZZINESS: 0
EYES NEGATIVE: 1
SPUTUM PRODUCTION: 0
WEAKNESS: 0
CARDIOVASCULAR NEGATIVE: 1
SORE THROAT: 0
FEVER: 0
PND: 0
COUGH: 0
RESPIRATORY NEGATIVE: 1
CONSTITUTIONAL NEGATIVE: 1
BRUISES/BLEEDS EASILY: 0
CLAUDICATION: 0
GASTROINTESTINAL NEGATIVE: 1
NEUROLOGICAL NEGATIVE: 1
WHEEZING: 0
SHORTNESS OF BREATH: 0

## 2022-11-17 ASSESSMENT — FIBROSIS 4 INDEX: FIB4 SCORE: 1.61

## 2022-11-17 NOTE — PROGRESS NOTES
Chief Complaint   Patient presents with    Dyslipidemia    Bradycardia       Subjective     Joel Cobb is a 61 y.o. male who presents today as a new consultation and preop for a colonoscopy.  He also has an elevated calcium score in the 1300 range.    Since he was last seen he is rosuvastatin dose was increased.  His LDL is down to 51.  His lipoprotein a levels were undetectable.  His homocysteine levels were normal.  He is now getting any chest pain shortness of breath or PND.    Past Medical History:   Diagnosis Date    Cervical radiculopathy at C7 10/10/2015    HSV-2 infection 10/19/2010    Hyperlipemia     Jet lag 10/19/2010     Past Surgical History:   Procedure Laterality Date    PROSTATECTOMY, RADICAL RETRO  10/22/14    MD Elder    VEIN STRIPPING      bilateral leg varicose veins     Family History   Problem Relation Age of Onset    Heart Disease Father         CHF    Stroke Father 60    Hypertension Father     Psychiatric Illness Paternal Uncle         Suicide    Cancer Maternal Aunt         lung     Social History     Socioeconomic History    Marital status:      Spouse name: Not on file    Number of children: 0    Years of education: Not on file    Highest education level: Not on file   Occupational History    Occupation:      Employer: OTHER     Comment: [employer]   Tobacco Use    Smoking status: Never    Smokeless tobacco: Never   Vaping Use    Vaping Use: Never used   Substance and Sexual Activity    Alcohol use: Yes     Alcohol/week: 0.5 - 7.0 oz     Types: 1 - 14 Cans of beer per week    Drug use: No    Sexual activity: Yes     Partners: Female   Other Topics Concern    Not on file   Social History Narrative    . Travels to [place], [place], [place] several times per year.      Social Determinants of Health     Financial Resource Strain: Not on file   Food Insecurity: Not on file   Transportation Needs: Not on file   Physical Activity: Not on file   Stress: Not on file   Social  Connections: Not on file   Intimate Partner Violence: Not on file   Housing Stability: Not on file     Allergies   Allergen Reactions    Nkda [No Known Drug Allergy]      Outpatient Encounter Medications as of 11/17/2022   Medication Sig Dispense Refill    rosuvastatin (CRESTOR) 20 MG Tab Take 1 Tablet by mouth every evening. 90 Tablet 3    valacyclovir (VALTREX) 1 GM Tab Take 0.5 Tablets by mouth 3 times a day. For 5-7 days prn rash. 135 tablet 3    Eszopiclone (LUNESTA) 3 MG Tab Take 1 Tab by mouth at bedtime as needed. 90 Tab 0    [DISCONTINUED] rosuvastatin (CRESTOR) 20 MG Tab Take 1 Tablet by mouth every evening. 90 Tablet 2     No facility-administered encounter medications on file as of 11/17/2022.     Review of Systems   Constitutional: Negative.  Negative for chills, fever and malaise/fatigue.   HENT: Negative.  Negative for sore throat.    Eyes: Negative.    Respiratory: Negative.  Negative for cough, hemoptysis, sputum production, shortness of breath, wheezing and stridor.    Cardiovascular: Negative.  Negative for chest pain, palpitations, orthopnea, claudication, leg swelling and PND.   Gastrointestinal: Negative.    Genitourinary: Negative.    Musculoskeletal: Negative.    Skin: Negative.    Neurological: Negative.  Negative for dizziness, loss of consciousness and weakness.   Endo/Heme/Allergies: Negative.  Does not bruise/bleed easily.   All other systems reviewed and are negative.           Objective     /70 (BP Location: Left arm, Patient Position: Sitting, BP Cuff Size: Adult)   Pulse (!) 53   Resp 16   Ht 1.829 m (6')   Wt 79.8 kg (176 lb)   SpO2 99%   BMI 23.87 kg/m²     Physical Exam  Vitals and nursing note reviewed.   Constitutional:       General: He is not in acute distress.     Appearance: He is well-developed. He is not diaphoretic.   HENT:      Head: Normocephalic and atraumatic.      Right Ear: External ear normal.      Left Ear: External ear normal.      Nose: Nose normal.       Mouth/Throat:      Pharynx: No oropharyngeal exudate.   Eyes:      General: No scleral icterus.        Right eye: No discharge.         Left eye: No discharge.      Conjunctiva/sclera: Conjunctivae normal.      Pupils: Pupils are equal, round, and reactive to light.   Neck:      Vascular: No JVD.   Cardiovascular:      Rate and Rhythm: Normal rate and regular rhythm.      Heart sounds: No murmur heard.    No friction rub. No gallop.   Pulmonary:      Effort: Pulmonary effort is normal. No respiratory distress.      Breath sounds: No stridor. No wheezing or rales.   Chest:      Chest wall: No tenderness.   Abdominal:      General: There is no distension.      Palpations: Abdomen is soft.      Tenderness: There is no guarding.   Musculoskeletal:         General: No tenderness or deformity. Normal range of motion.      Cervical back: Neck supple.   Skin:     General: Skin is warm and dry.      Coloration: Skin is not pale.      Findings: No erythema or rash.   Neurological:      Mental Status: He is alert.      Cranial Nerves: No cranial nerve deficit.      Motor: No abnormal muscle tone.      Coordination: Coordination normal.      Deep Tendon Reflexes: Reflexes are normal and symmetric. Reflexes normal.   Psychiatric:         Behavior: Behavior normal.         Thought Content: Thought content normal.         Judgment: Judgment normal.              Assessment & Plan     1. Dyslipidemia        2. Erectile dysfunction, unspecified erectile dysfunction type        3. Pre-operative cardiovascular examination        4. Sinus bradycardia  rosuvastatin (CRESTOR) 20 MG Tab      5. Atherosclerosis of aorta (HCC)        6. Agatston CAC score, >400            Medical Decision Making: Today's Assessment/Status/Plan:        61-year-old male with a high calcium score and resting bradycardia.  We will keep on the rosuvastatin.  Otherwise no further changes.  Given a negative lipoprotein a level and negative homocysteine and all  feel that were missing a secondary cause and he does not smoke or have an unhealthy diet.  We will stay on the rosuvastatin and see him back in 6 months.

## 2022-11-18 ENCOUNTER — PATIENT MESSAGE (OUTPATIENT)
Dept: MEDICAL GROUP | Age: 62
End: 2022-11-18
Payer: COMMERCIAL

## 2022-11-18 DIAGNOSIS — C61 PROSTATE CARCINOMA (HCC): ICD-10-CM

## 2022-11-21 ENCOUNTER — HOSPITAL ENCOUNTER (OUTPATIENT)
Dept: LAB | Facility: MEDICAL CENTER | Age: 62
End: 2022-11-21
Attending: FAMILY MEDICINE
Payer: COMMERCIAL

## 2022-11-21 DIAGNOSIS — C61 PROSTATE CARCINOMA (HCC): ICD-10-CM

## 2022-11-21 LAB — PSA SERPL-MCNC: <0.02 NG/ML (ref 0–4)

## 2022-11-21 PROCEDURE — 84402 ASSAY OF FREE TESTOSTERONE: CPT

## 2022-11-21 PROCEDURE — 84270 ASSAY OF SEX HORMONE GLOBUL: CPT

## 2022-11-21 PROCEDURE — 84403 ASSAY OF TOTAL TESTOSTERONE: CPT

## 2022-11-21 PROCEDURE — 84153 ASSAY OF PSA TOTAL: CPT

## 2022-11-21 PROCEDURE — 36415 COLL VENOUS BLD VENIPUNCTURE: CPT

## 2022-11-23 LAB
SHBG SERPL-SCNC: 55 NMOL/L (ref 19–76)
TESTOST FREE MFR SERPL: 1.3 % (ref 1.6–2.9)
TESTOST FREE SERPL-MCNC: 53 PG/ML (ref 47–244)
TESTOST SERPL-MCNC: 392 NG/DL (ref 300–720)

## 2023-02-08 ENCOUNTER — PATIENT MESSAGE (OUTPATIENT)
Dept: CARDIOLOGY | Facility: MEDICAL CENTER | Age: 63
End: 2023-02-08
Payer: COMMERCIAL

## 2023-02-08 DIAGNOSIS — R00.1 SINUS BRADYCARDIA: ICD-10-CM

## 2023-03-03 RX ORDER — ROSUVASTATIN CALCIUM 10 MG/1
10 TABLET, COATED ORAL EVERY EVENING
Qty: 90 TABLET | Refills: 2 | Status: SHIPPED | OUTPATIENT
Start: 2023-03-03

## 2023-05-31 DIAGNOSIS — E78.00 PURE HYPERCHOLESTEROLEMIA: ICD-10-CM

## 2023-05-31 DIAGNOSIS — R53.82 CHRONIC FATIGUE: ICD-10-CM

## 2023-05-31 DIAGNOSIS — N40.0 BENIGN PROSTATIC HYPERPLASIA WITHOUT LOWER URINARY TRACT SYMPTOMS: ICD-10-CM

## 2023-05-31 DIAGNOSIS — Z00.00 ANNUAL PHYSICAL EXAM: ICD-10-CM

## 2023-06-01 ENCOUNTER — HOSPITAL ENCOUNTER (OUTPATIENT)
Dept: LAB | Facility: MEDICAL CENTER | Age: 63
End: 2023-06-01
Attending: FAMILY MEDICINE
Payer: COMMERCIAL

## 2023-06-01 DIAGNOSIS — R53.82 CHRONIC FATIGUE: ICD-10-CM

## 2023-06-01 DIAGNOSIS — N40.0 BENIGN PROSTATIC HYPERPLASIA WITHOUT LOWER URINARY TRACT SYMPTOMS: ICD-10-CM

## 2023-06-01 DIAGNOSIS — E78.00 PURE HYPERCHOLESTEROLEMIA: ICD-10-CM

## 2023-06-01 DIAGNOSIS — Z00.00 ANNUAL PHYSICAL EXAM: ICD-10-CM

## 2023-06-01 LAB — PSA SERPL-MCNC: <0.02 NG/ML (ref 0–4)

## 2023-06-01 PROCEDURE — 36415 COLL VENOUS BLD VENIPUNCTURE: CPT

## 2023-06-01 PROCEDURE — 84153 ASSAY OF PSA TOTAL: CPT

## 2023-06-01 PROCEDURE — 84402 ASSAY OF FREE TESTOSTERONE: CPT

## 2023-06-01 PROCEDURE — 84270 ASSAY OF SEX HORMONE GLOBUL: CPT

## 2023-06-01 PROCEDURE — 84403 ASSAY OF TOTAL TESTOSTERONE: CPT

## 2023-06-03 LAB
SHBG SERPL-SCNC: 56 NMOL/L (ref 19–76)
TESTOST FREE MFR SERPL: 1.3 % (ref 1.6–2.9)
TESTOST FREE SERPL-MCNC: 45 PG/ML (ref 47–244)
TESTOST SERPL-MCNC: 341 NG/DL (ref 300–720)

## 2023-06-05 ENCOUNTER — HOSPITAL ENCOUNTER (OUTPATIENT)
Dept: LAB | Facility: MEDICAL CENTER | Age: 63
End: 2023-06-05
Attending: FAMILY MEDICINE
Payer: COMMERCIAL

## 2023-06-05 DIAGNOSIS — Z00.00 ANNUAL PHYSICAL EXAM: ICD-10-CM

## 2023-06-05 DIAGNOSIS — E78.00 PURE HYPERCHOLESTEROLEMIA: ICD-10-CM

## 2023-06-05 LAB
ALBUMIN SERPL BCP-MCNC: 4.7 G/DL (ref 3.2–4.9)
ALBUMIN/GLOB SERPL: 1.8 G/DL
ALP SERPL-CCNC: 73 U/L (ref 30–99)
ALT SERPL-CCNC: 27 U/L (ref 2–50)
ANION GAP SERPL CALC-SCNC: 11 MMOL/L (ref 7–16)
AST SERPL-CCNC: 32 U/L (ref 12–45)
BASOPHILS # BLD AUTO: 1.2 % (ref 0–1.8)
BASOPHILS # BLD: 0.04 K/UL (ref 0–0.12)
BILIRUB SERPL-MCNC: 1.2 MG/DL (ref 0.1–1.5)
BUN SERPL-MCNC: 15 MG/DL (ref 8–22)
CALCIUM ALBUM COR SERPL-MCNC: 9.3 MG/DL (ref 8.5–10.5)
CALCIUM SERPL-MCNC: 9.9 MG/DL (ref 8.5–10.5)
CHLORIDE SERPL-SCNC: 107 MMOL/L (ref 96–112)
CHOLEST SERPL-MCNC: 148 MG/DL (ref 100–199)
CO2 SERPL-SCNC: 26 MMOL/L (ref 20–33)
CREAT SERPL-MCNC: 0.9 MG/DL (ref 0.5–1.4)
EOSINOPHIL # BLD AUTO: 0.07 K/UL (ref 0–0.51)
EOSINOPHIL NFR BLD: 2.2 % (ref 0–6.9)
ERYTHROCYTE [DISTWIDTH] IN BLOOD BY AUTOMATED COUNT: 44.7 FL (ref 35.9–50)
GFR SERPLBLD CREATININE-BSD FMLA CKD-EPI: 97 ML/MIN/1.73 M 2
GLOBULIN SER CALC-MCNC: 2.6 G/DL (ref 1.9–3.5)
GLUCOSE SERPL-MCNC: 93 MG/DL (ref 65–99)
HCT VFR BLD AUTO: 45.1 % (ref 42–52)
HDLC SERPL-MCNC: 68 MG/DL
HGB BLD-MCNC: 15.2 G/DL (ref 14–18)
IMM GRANULOCYTES # BLD AUTO: 0 K/UL (ref 0–0.11)
IMM GRANULOCYTES NFR BLD AUTO: 0 % (ref 0–0.9)
LDLC SERPL CALC-MCNC: 66 MG/DL
LYMPHOCYTES # BLD AUTO: 0.94 K/UL (ref 1–4.8)
LYMPHOCYTES NFR BLD: 29 % (ref 22–41)
MCH RBC QN AUTO: 31.3 PG (ref 27–33)
MCHC RBC AUTO-ENTMCNC: 33.7 G/DL (ref 32.3–36.5)
MCV RBC AUTO: 93 FL (ref 81.4–97.8)
MONOCYTES # BLD AUTO: 0.35 K/UL (ref 0–0.85)
MONOCYTES NFR BLD AUTO: 10.8 % (ref 0–13.4)
NEUTROPHILS # BLD AUTO: 1.84 K/UL (ref 1.82–7.42)
NEUTROPHILS NFR BLD: 56.8 % (ref 44–72)
NRBC # BLD AUTO: 0 K/UL
NRBC BLD-RTO: 0 /100 WBC (ref 0–0.2)
PLATELET # BLD AUTO: 192 K/UL (ref 164–446)
PMV BLD AUTO: 10.6 FL (ref 9–12.9)
POTASSIUM SERPL-SCNC: 5.8 MMOL/L (ref 3.6–5.5)
PROT SERPL-MCNC: 7.3 G/DL (ref 6–8.2)
RBC # BLD AUTO: 4.85 M/UL (ref 4.7–6.1)
SODIUM SERPL-SCNC: 144 MMOL/L (ref 135–145)
T3FREE SERPL-MCNC: 3.24 PG/ML (ref 2–4.4)
T4 FREE SERPL-MCNC: 1.1 NG/DL (ref 0.93–1.7)
TRIGL SERPL-MCNC: 68 MG/DL (ref 0–149)
TSH SERPL DL<=0.005 MIU/L-ACNC: 1.55 UIU/ML (ref 0.38–5.33)
WBC # BLD AUTO: 3.2 K/UL (ref 4.8–10.8)

## 2023-06-05 PROCEDURE — 80053 COMPREHEN METABOLIC PANEL: CPT

## 2023-06-05 PROCEDURE — 36415 COLL VENOUS BLD VENIPUNCTURE: CPT

## 2023-06-05 PROCEDURE — 84481 FREE ASSAY (FT-3): CPT

## 2023-06-05 PROCEDURE — 80061 LIPID PANEL: CPT

## 2023-06-05 PROCEDURE — 85025 COMPLETE CBC W/AUTO DIFF WBC: CPT

## 2023-06-05 PROCEDURE — 84439 ASSAY OF FREE THYROXINE: CPT

## 2023-06-05 PROCEDURE — 84443 ASSAY THYROID STIM HORMONE: CPT

## 2023-06-07 DIAGNOSIS — E87.5 HYPERKALEMIA: ICD-10-CM

## 2023-06-20 SDOH — HEALTH STABILITY: PHYSICAL HEALTH: ON AVERAGE, HOW MANY MINUTES DO YOU ENGAGE IN EXERCISE AT THIS LEVEL?: 80 MIN

## 2023-06-20 SDOH — ECONOMIC STABILITY: HOUSING INSECURITY: IN THE LAST 12 MONTHS, HOW MANY PLACES HAVE YOU LIVED?: 1

## 2023-06-20 SDOH — ECONOMIC STABILITY: FOOD INSECURITY: WITHIN THE PAST 12 MONTHS, THE FOOD YOU BOUGHT JUST DIDN'T LAST AND YOU DIDN'T HAVE MONEY TO GET MORE.: NEVER TRUE

## 2023-06-20 SDOH — HEALTH STABILITY: MENTAL HEALTH
STRESS IS WHEN SOMEONE FEELS TENSE, NERVOUS, ANXIOUS, OR CAN'T SLEEP AT NIGHT BECAUSE THEIR MIND IS TROUBLED. HOW STRESSED ARE YOU?: TO SOME EXTENT

## 2023-06-20 SDOH — ECONOMIC STABILITY: INCOME INSECURITY: HOW HARD IS IT FOR YOU TO PAY FOR THE VERY BASICS LIKE FOOD, HOUSING, MEDICAL CARE, AND HEATING?: NOT HARD AT ALL

## 2023-06-20 SDOH — ECONOMIC STABILITY: HOUSING INSECURITY
IN THE LAST 12 MONTHS, WAS THERE A TIME WHEN YOU DID NOT HAVE A STEADY PLACE TO SLEEP OR SLEPT IN A SHELTER (INCLUDING NOW)?: NO

## 2023-06-20 SDOH — ECONOMIC STABILITY: INCOME INSECURITY: IN THE LAST 12 MONTHS, WAS THERE A TIME WHEN YOU WERE NOT ABLE TO PAY THE MORTGAGE OR RENT ON TIME?: NO

## 2023-06-20 SDOH — ECONOMIC STABILITY: TRANSPORTATION INSECURITY
IN THE PAST 12 MONTHS, HAS THE LACK OF TRANSPORTATION KEPT YOU FROM MEDICAL APPOINTMENTS OR FROM GETTING MEDICATIONS?: NO

## 2023-06-20 SDOH — ECONOMIC STABILITY: FOOD INSECURITY: WITHIN THE PAST 12 MONTHS, YOU WORRIED THAT YOUR FOOD WOULD RUN OUT BEFORE YOU GOT MONEY TO BUY MORE.: NEVER TRUE

## 2023-06-20 SDOH — ECONOMIC STABILITY: TRANSPORTATION INSECURITY
IN THE PAST 12 MONTHS, HAS LACK OF TRANSPORTATION KEPT YOU FROM MEETINGS, WORK, OR FROM GETTING THINGS NEEDED FOR DAILY LIVING?: NO

## 2023-06-20 SDOH — HEALTH STABILITY: PHYSICAL HEALTH: ON AVERAGE, HOW MANY DAYS PER WEEK DO YOU ENGAGE IN MODERATE TO STRENUOUS EXERCISE (LIKE A BRISK WALK)?: 6 DAYS

## 2023-06-20 SDOH — ECONOMIC STABILITY: TRANSPORTATION INSECURITY
IN THE PAST 12 MONTHS, HAS LACK OF RELIABLE TRANSPORTATION KEPT YOU FROM MEDICAL APPOINTMENTS, MEETINGS, WORK OR FROM GETTING THINGS NEEDED FOR DAILY LIVING?: NO

## 2023-06-20 ASSESSMENT — SOCIAL DETERMINANTS OF HEALTH (SDOH)
DO YOU BELONG TO ANY CLUBS OR ORGANIZATIONS SUCH AS CHURCH GROUPS UNIONS, FRATERNAL OR ATHLETIC GROUPS, OR SCHOOL GROUPS?: YES
HOW OFTEN DO YOU HAVE SIX OR MORE DRINKS ON ONE OCCASION: NEVER
IN A TYPICAL WEEK, HOW MANY TIMES DO YOU TALK ON THE PHONE WITH FAMILY, FRIENDS, OR NEIGHBORS?: TWICE A WEEK
WITHIN THE PAST 12 MONTHS, YOU WORRIED THAT YOUR FOOD WOULD RUN OUT BEFORE YOU GOT THE MONEY TO BUY MORE: NEVER TRUE
HOW OFTEN DO YOU HAVE A DRINK CONTAINING ALCOHOL: 2-4 TIMES A MONTH
HOW OFTEN DO YOU ATTEND CHURCH OR RELIGIOUS SERVICES?: PATIENT DECLINED
HOW HARD IS IT FOR YOU TO PAY FOR THE VERY BASICS LIKE FOOD, HOUSING, MEDICAL CARE, AND HEATING?: NOT HARD AT ALL
HOW MANY DRINKS CONTAINING ALCOHOL DO YOU HAVE ON A TYPICAL DAY WHEN YOU ARE DRINKING: 3 OR 4
DO YOU BELONG TO ANY CLUBS OR ORGANIZATIONS SUCH AS CHURCH GROUPS UNIONS, FRATERNAL OR ATHLETIC GROUPS, OR SCHOOL GROUPS?: YES
HOW OFTEN DO YOU GET TOGETHER WITH FRIENDS OR RELATIVES?: TWICE A WEEK
HOW OFTEN DO YOU ATTEND CHURCH OR RELIGIOUS SERVICES?: PATIENT DECLINED
HOW OFTEN DO YOU ATTENT MEETINGS OF THE CLUB OR ORGANIZATION YOU BELONG TO?: MORE THAN 4 TIMES PER YEAR
HOW OFTEN DO YOU GET TOGETHER WITH FRIENDS OR RELATIVES?: TWICE A WEEK
IN A TYPICAL WEEK, HOW MANY TIMES DO YOU TALK ON THE PHONE WITH FAMILY, FRIENDS, OR NEIGHBORS?: TWICE A WEEK
HOW OFTEN DO YOU ATTENT MEETINGS OF THE CLUB OR ORGANIZATION YOU BELONG TO?: MORE THAN 4 TIMES PER YEAR

## 2023-06-20 ASSESSMENT — LIFESTYLE VARIABLES
HOW MANY STANDARD DRINKS CONTAINING ALCOHOL DO YOU HAVE ON A TYPICAL DAY: 3 OR 4
AUDIT-C TOTAL SCORE: 3
SKIP TO QUESTIONS 9-10: 0
HOW OFTEN DO YOU HAVE SIX OR MORE DRINKS ON ONE OCCASION: NEVER
HOW OFTEN DO YOU HAVE A DRINK CONTAINING ALCOHOL: 2-4 TIMES A MONTH

## 2023-06-21 ENCOUNTER — OFFICE VISIT (OUTPATIENT)
Dept: MEDICAL GROUP | Age: 63
End: 2023-06-21
Payer: COMMERCIAL

## 2023-06-21 VITALS
HEART RATE: 58 BPM | BODY MASS INDEX: 23.7 KG/M2 | OXYGEN SATURATION: 97 % | DIASTOLIC BLOOD PRESSURE: 60 MMHG | TEMPERATURE: 98.6 F | HEIGHT: 72 IN | WEIGHT: 175 LBS | SYSTOLIC BLOOD PRESSURE: 100 MMHG

## 2023-06-21 DIAGNOSIS — E87.5 HYPERKALEMIA: ICD-10-CM

## 2023-06-21 DIAGNOSIS — Z00.00 ANNUAL PHYSICAL EXAM: ICD-10-CM

## 2023-06-21 PROBLEM — R79.89 LOW TESTOSTERONE: Status: ACTIVE | Noted: 2023-06-21

## 2023-06-21 PROCEDURE — 3074F SYST BP LT 130 MM HG: CPT | Performed by: FAMILY MEDICINE

## 2023-06-21 PROCEDURE — 3078F DIAST BP <80 MM HG: CPT | Performed by: FAMILY MEDICINE

## 2023-06-21 PROCEDURE — 99396 PREV VISIT EST AGE 40-64: CPT | Performed by: FAMILY MEDICINE

## 2023-06-21 ASSESSMENT — PATIENT HEALTH QUESTIONNAIRE - PHQ9: CLINICAL INTERPRETATION OF PHQ2 SCORE: 0

## 2023-06-21 ASSESSMENT — FIBROSIS 4 INDEX: FIB4 SCORE: 1.96

## 2023-06-21 NOTE — PROGRESS NOTES
Subjective:     CC:   Chief Complaint   Patient presents with    Annual Exam    Lab Results       HPI:   Joel Cobb is a 62 y.o. male who presents for the following    1. 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  He  has a past surgical history that includes vein stripping and prostatectomy, radical retro (10/22/14).    Family History   Problem Relation Age of Onset    Heart Disease Father         CHF    Stroke Father 60    Hypertension Father     Psychiatric Illness Paternal Uncle         Suicide    Cancer Maternal Aunt         lung     Social History     Tobacco Use    Smoking status: Never    Smokeless tobacco: Never   Vaping Use    Vaping Use: Never used   Substance Use Topics    Alcohol use: Yes     Alcohol/week: 0.5 - 7.0 oz     Types: 1 - 14 Cans of beer per week    Drug use: No     He  reports being sexually active and has had partner(s) who are female.    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 90 Tablet 2    valacyclovir (VALTREX) 1 GM Tab Take 0.5 Tablets by mouth 3 times a day. For 5-7 days prn rash. 135 tablet 3    Eszopiclone (LUNESTA) 3 MG Tab Take 1 Tab by mouth at bedtime as needed. 90 Tab 0     No current facility-administered medications for this visit.     Allergies   Allergen Reactions    Nkda [No Known Drug Allergy]        Review of Systems   Constitutional: Negative for fever, chills and malaise/fatigue.   HENT: Negative for congestion.    Eyes: Negative for pain.   Respiratory: Negative for cough and shortness of breath.    Cardiovascular: Negative for chest pain and leg swelling.   Gastrointestinal: Negative for nausea, vomiting, abdominal pain and diarrhea.   Genitourinary: Negative for dysuria and hematuria.   Skin: Negative for rash.   Neurological: Negative for dizziness, focal weakness and headaches.   Endo/Heme/Allergies: Does not bruise/bleed easily.   Psychiatric/Behavioral: Negative for depression.  The patient is not nervous/anxious.      Objective:   /60 (BP Location: Left arm, Patient Position: Sitting, BP Cuff Size: Adult)   Pulse (!) 58   Temp 37 °C (98.6 °F)   Ht 1.829 m (6')   Wt 79.4 kg (175 lb)   SpO2 97%   BMI 23.73 kg/m²      Wt Readings from Last 4 Encounters:   06/21/23 79.4 kg (175 lb)   11/17/22 79.8 kg (176 lb)   05/24/22 79.4 kg (175 lb)   04/06/22 78.4 kg (172 lb 12.8 oz)           Physical Exam:  Constitutional: Alert, no distress, well-groomed.  Skin: No rashes in visible areas.  Eye: Round. Conjunctiva clear, lids normal. No icterus.   ENMT: Lips pink without lesions, good dentition, moist mucous membranes. Phonation normal.  Neck: No masses, no thyromegaly. Moves freely without pain.  Respiratory: Unlabored respiratory effort, no cough or audible wheeze  Psych: Alert and oriented  x3, normal affect and mood.       Assessment and Plan:     1. Annual physical exam    2. Hyperkalemia  - Basic Metabolic Panel; Future      We reviewed anticipatory guidelines  The patient is up-to-date on all vaccines  The patient is  UTD for annual labs  We discussed diet and exercise  Specifically we discussed needing 150 minutes of exercise weekly  Also to incorporate and aerobic exercises  We discussed sunscreen  We discussed seatbelt safety       Health maintenance:     Labs per orders  Immunizations per orders  Patient counseled about skin care, diet, supplements, and exercise.  Discussed diet and exercise     Follow-up: Return in about 6 months (around 12/21/2023) for Reevaluation.

## 2023-07-03 ENCOUNTER — HOSPITAL ENCOUNTER (OUTPATIENT)
Dept: LAB | Facility: MEDICAL CENTER | Age: 63
End: 2023-07-03
Attending: FAMILY MEDICINE
Payer: COMMERCIAL

## 2023-07-03 DIAGNOSIS — E87.5 HYPERKALEMIA: ICD-10-CM

## 2023-07-03 LAB
ANION GAP SERPL CALC-SCNC: 12 MMOL/L (ref 7–16)
BUN SERPL-MCNC: 20 MG/DL (ref 8–22)
CALCIUM SERPL-MCNC: 9.1 MG/DL (ref 8.5–10.5)
CHLORIDE SERPL-SCNC: 103 MMOL/L (ref 96–112)
CO2 SERPL-SCNC: 24 MMOL/L (ref 20–33)
CREAT SERPL-MCNC: 0.97 MG/DL (ref 0.5–1.4)
GFR SERPLBLD CREATININE-BSD FMLA CKD-EPI: 88 ML/MIN/1.73 M 2
GLUCOSE SERPL-MCNC: 84 MG/DL (ref 65–99)
POTASSIUM SERPL-SCNC: 4.1 MMOL/L (ref 3.6–5.5)
SODIUM SERPL-SCNC: 139 MMOL/L (ref 135–145)

## 2023-07-03 PROCEDURE — 80048 BASIC METABOLIC PNL TOTAL CA: CPT

## 2023-07-03 PROCEDURE — 36415 COLL VENOUS BLD VENIPUNCTURE: CPT

## 2023-11-16 ENCOUNTER — TELEPHONE (OUTPATIENT)
Dept: CARDIOLOGY | Facility: MEDICAL CENTER | Age: 63
End: 2023-11-16
Payer: COMMERCIAL

## 2023-11-17 ENCOUNTER — APPOINTMENT (OUTPATIENT)
Dept: CARDIOLOGY | Facility: MEDICAL CENTER | Age: 63
End: 2023-11-17
Attending: INTERNAL MEDICINE
Payer: COMMERCIAL

## 2023-11-28 DIAGNOSIS — B00.9 HSV-2 INFECTION: ICD-10-CM

## 2023-11-30 DIAGNOSIS — B00.9 HSV-2 INFECTION: ICD-10-CM

## 2023-11-30 RX ORDER — VALACYCLOVIR HYDROCHLORIDE 1 G/1
500 TABLET, FILM COATED ORAL 3 TIMES DAILY
Qty: 135 TABLET | Refills: 3 | Status: SHIPPED | OUTPATIENT
Start: 2023-11-30 | End: 2023-12-01

## 2023-12-01 RX ORDER — ACYCLOVIR 800 MG/1
800 TABLET ORAL 2 TIMES DAILY
Qty: 180 TABLET | Refills: 0 | Status: SHIPPED | OUTPATIENT
Start: 2023-12-01 | End: 2024-02-27

## 2023-12-20 ENCOUNTER — OFFICE VISIT (OUTPATIENT)
Dept: URGENT CARE | Facility: CLINIC | Age: 63
End: 2023-12-20
Payer: COMMERCIAL

## 2023-12-20 VITALS
HEART RATE: 58 BPM | RESPIRATION RATE: 16 BRPM | TEMPERATURE: 97.9 F | OXYGEN SATURATION: 96 % | WEIGHT: 175 LBS | HEIGHT: 72 IN | SYSTOLIC BLOOD PRESSURE: 122 MMHG | BODY MASS INDEX: 23.7 KG/M2 | DIASTOLIC BLOOD PRESSURE: 78 MMHG

## 2023-12-20 DIAGNOSIS — R05.9 COUGH, UNSPECIFIED TYPE: ICD-10-CM

## 2023-12-20 PROCEDURE — 3074F SYST BP LT 130 MM HG: CPT

## 2023-12-20 PROCEDURE — 99213 OFFICE O/P EST LOW 20 MIN: CPT

## 2023-12-20 PROCEDURE — 3078F DIAST BP <80 MM HG: CPT

## 2023-12-20 RX ORDER — MELOXICAM 15 MG/1
15 TABLET ORAL
COMMUNITY
Start: 2023-12-01

## 2023-12-20 RX ORDER — BENZONATATE 100 MG/1
100 CAPSULE ORAL 3 TIMES DAILY PRN
Qty: 60 CAPSULE | Refills: 0 | Status: SHIPPED | OUTPATIENT
Start: 2023-12-20 | End: 2024-01-02

## 2023-12-20 ASSESSMENT — FIBROSIS 4 INDEX: FIB4 SCORE: 1.99

## 2023-12-20 ASSESSMENT — ENCOUNTER SYMPTOMS
SPUTUM PRODUCTION: 0
FEVER: 0
DIAPHORESIS: 0
COUGH: 1
WEIGHT LOSS: 0

## 2023-12-20 NOTE — PROGRESS NOTES
CHIEF COMPLAINT  Chief Complaint   Patient presents with    Cough     Persistent dry cough x 3 weeks       Subjective:   Joel Cobb is a 63 y.o. male who presents for complaints of persistent dry cough for the last 3 weeks. Her reports associated symptoms of post nasal drip. Patient states that he has noticed some mild improvement in cough, as it is no longer keeping him awake at night. He is very active and does report some mild shortness of breath with running.  He denies any fevers or chest pain.  He denies any unexplained weight loss or night sweats.        Review of Systems   Constitutional:  Negative for diaphoresis, fever, malaise/fatigue and weight loss.   Respiratory:  Positive for cough. Negative for sputum production.        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    ALLERGIES  Allergies   Allergen Reactions    Nkda [No Known Drug Allergy]        CURRENT MEDICATIONS  Home Medications       Reviewed by Richard Robert Ass't (Medical Assistant) on 12/20/23 at 0854  Med List Status: <None>     Medication Last Dose Status   acyclovir (ZOVIRAX) 800 MG Tab PRN Active   Eszopiclone (LUNESTA) 3 MG Tab Not  Taking Active   meloxicam (MOBIC) 15 MG tablet  Active   rosuvastatin (CRESTOR) 10 MG Tab Taking Active                    SOCIAL HISTORY  Social History     Tobacco Use    Smoking status: Never    Smokeless tobacco: Never   Vaping Use    Vaping Use: Never used   Substance and Sexual Activity    Alcohol use: Yes     Alcohol/week: 0.5 - 7.0 oz     Types: 1 - 14 Cans of beer per week    Drug use: No    Sexual activity: Yes     Partners: Female       FAMILY HISTORY  Family History   Problem Relation Age of Onset    Heart Disease Father         CHF    Stroke Father 60    Hypertension Father     Psychiatric Illness Paternal Uncle         Suicide    Cancer Maternal Aunt         lung         Medications, Allergies, and current problem list reviewed today in Epic.     Objective:     /78 (BP Location: Left arm, Patient Position: Sitting, BP Cuff Size: Adult)   Pulse (!) 58   Temp 36.6 °C (97.9 °F) (Temporal)   Resp 16   Ht 1.829 m (6')   Wt 79.4 kg (175 lb)   SpO2 96%     Physical Exam  Vitals reviewed.   Constitutional:       General: He is not in acute distress.     Appearance: Normal appearance. He is not ill-appearing or toxic-appearing.   HENT:      Head: Normocephalic and atraumatic.      Right Ear: Tympanic membrane normal.      Left Ear: Tympanic membrane normal.      Nose: Nose normal. No congestion or rhinorrhea.      Mouth/Throat:      Mouth: Mucous membranes are moist.      Pharynx: Oropharynx is clear. No oropharyngeal exudate or posterior oropharyngeal erythema.   Cardiovascular:      Rate and Rhythm: Normal rate and regular rhythm.      Pulses: Normal pulses.      Heart sounds: Normal heart sounds.   Pulmonary:      Effort: Pulmonary effort is normal. No respiratory distress.      Breath sounds: Normal breath sounds. No stridor. No wheezing, rhonchi or rales.   Musculoskeletal:      Cervical back: Normal range of motion. No rigidity or tenderness.   Lymphadenopathy:      Cervical: No cervical  adenopathy.   Skin:     General: Skin is warm.      Capillary Refill: Capillary refill takes less than 2 seconds.   Neurological:      General: No focal deficit present.      Mental Status: He is alert.   Psychiatric:         Mood and Affect: Mood normal.         Assessment/Plan:     Diagnosis and associated orders:     1. Cough, unspecified type  benzonatate (TESSALON) 100 MG Cap         Comments/MDM:     Patient presents urgent care with complaints of persistent dry cough last 3 weeks.  He denies any fevers or shortness of breath.  He denies any unexplained weight loss or night sweats.  Reports some mild improvement in symptoms of cough.  Upon physical exam patient is alert no apparent signs of distress.  Bilateral TMs are normal.  No pharyngeal erythema or exudate.  Neck is supple no lymphadenopathy.  Mucous membranes are moist.  Mild postnasal drip appreciated.  He is clear to auscultation bilaterally.  No crackles, rhonchi or wheezes appreciated.  Vital signs are stable in clinic, he is afebrile.  SpO2 of 96%.  Shared decision not to do course of steroids at this time, as patient is currently taking naproxen for shoulder pain and does not want to stop use of NSAID.  Prescription for Tessalon Perles sent to preferred pharmacy.  Counseled patient on typical course of postviral cough.  Instructed patient to return to urgent care or ER if symptoms worsen, persistent fever or shortness of breath develops.         Differential diagnosis, natural history, supportive care, and indications for immediate follow-up discussed.    Advised the patient to follow-up with the primary care physician for recheck, reevaluation, and consideration of further management.    Please note that this dictation was created using voice recognition software. I have made a reasonable attempt to correct obvious errors, but I expect that there are errors of grammar and possibly content that I did not discover before finalizing the note.    This  note was electronically signed by ALEKSANDR Carlson

## 2024-01-02 ENCOUNTER — OFFICE VISIT (OUTPATIENT)
Dept: CARDIOLOGY | Facility: MEDICAL CENTER | Age: 64
End: 2024-01-02
Payer: COMMERCIAL

## 2024-01-02 VITALS
RESPIRATION RATE: 16 BRPM | SYSTOLIC BLOOD PRESSURE: 114 MMHG | DIASTOLIC BLOOD PRESSURE: 64 MMHG | HEIGHT: 72 IN | HEART RATE: 54 BPM | BODY MASS INDEX: 24.11 KG/M2 | WEIGHT: 178 LBS | OXYGEN SATURATION: 99 %

## 2024-01-02 DIAGNOSIS — I70.0 ATHEROSCLEROSIS OF AORTA (HCC): ICD-10-CM

## 2024-01-02 DIAGNOSIS — E78.5 DYSLIPIDEMIA: ICD-10-CM

## 2024-01-02 DIAGNOSIS — R93.1 AGATSTON CAC SCORE, >400: ICD-10-CM

## 2024-01-02 DIAGNOSIS — R00.1 SINUS BRADYCARDIA: ICD-10-CM

## 2024-01-02 PROCEDURE — 3074F SYST BP LT 130 MM HG: CPT

## 2024-01-02 PROCEDURE — 3078F DIAST BP <80 MM HG: CPT

## 2024-01-02 PROCEDURE — 99211 OFF/OP EST MAY X REQ PHY/QHP: CPT

## 2024-01-02 PROCEDURE — 99214 OFFICE O/P EST MOD 30 MIN: CPT

## 2024-01-02 ASSESSMENT — ENCOUNTER SYMPTOMS
NEUROLOGICAL NEGATIVE: 1
DEPRESSION: 0
EYES NEGATIVE: 1
NERVOUS/ANXIOUS: 0
MYALGIAS: 1
SHORTNESS OF BREATH: 0
GASTROINTESTINAL NEGATIVE: 1
ORTHOPNEA: 0
CONSTITUTIONAL NEGATIVE: 1
PND: 0
PALPITATIONS: 0

## 2024-01-02 ASSESSMENT — FIBROSIS 4 INDEX: FIB4 SCORE: 1.99

## 2024-01-02 NOTE — PROGRESS NOTES
Chief Complaint   Patient presents with    Dyslipidemia    Bradycardia       Subjective     Joel Cobb is a 63 y.o. male who presents today for follow up. They have a history of elevated coronary calcium score and bradycardia.    Last seen by Dr. Capps on 11/17/2022, at that visit he had had improvement in his LDL level, and negative LP(a) level and was recommended to follow-up in 6 months    Today 1/2/2024 he has been having muscle pain that he believes to be associated with his Crestor.  He has also recently been started on meloxicam for shoulder pain which has helped with his muscle pains.    Activity: Very active, exercises regularly        Past Medical History:   Diagnosis Date    Cervical radiculopathy at C7 10/10/2015    HSV-2 infection 10/19/2010    Hyperlipemia     Jet lag 10/19/2010     Past Surgical History:   Procedure Laterality Date    PROSTATECTOMY, RADICAL RETRO  10/22/14    MD Elder    VEIN STRIPPING      bilateral leg varicose veins     Family History   Problem Relation Age of Onset    Heart Disease Father         CHF    Stroke Father 60    Hypertension Father     Psychiatric Illness Paternal Uncle         Suicide    Cancer Maternal Aunt         lung     Social History     Socioeconomic History    Marital status:      Spouse name: Not on file    Number of children: 0    Years of education: Not on file    Highest education level: Master's degree (e.g., MA, MS, Chris, MEd, MSW, KATTY)   Occupational History    Occupation:      Employer: OTHER     Comment: [employer]   Tobacco Use    Smoking status: Never    Smokeless tobacco: Never   Vaping Use    Vaping Use: Never used   Substance and Sexual Activity    Alcohol use: Yes     Alcohol/week: 0.5 - 7.0 oz     Types: 1 - 14 Cans of beer per week    Drug use: No    Sexual activity: Yes     Partners: Female   Other Topics Concern    Not on file   Social History Narrative    . Travels to [place], [place], [place] several times per year.       Social Determinants of Health     Financial Resource Strain: Low Risk  (6/20/2023)    Overall Financial Resource Strain (CARDIA)     Difficulty of Paying Living Expenses: Not hard at all   Food Insecurity: No Food Insecurity (6/20/2023)    Hunger Vital Sign     Worried About Running Out of Food in the Last Year: Never true     Ran Out of Food in the Last Year: Never true   Transportation Needs: No Transportation Needs (6/20/2023)    PRAPARE - Transportation     Lack of Transportation (Medical): No     Lack of Transportation (Non-Medical): No   Physical Activity: Sufficiently Active (6/20/2023)    Exercise Vital Sign     Days of Exercise per Week: 6 days     Minutes of Exercise per Session: 80 min   Stress: Stress Concern Present (6/20/2023)    [place] [employer] - Occupational Stress Questionnaire     Feeling of Stress : To some extent   Social Connections: Unknown (6/20/2023)    Social Connection and Isolation Panel [NHANES]     Frequency of Communication with Friends and Family: Twice a week     Frequency of Social Gatherings with Friends and Family: Twice a week     Attends Christian Services: Patient refused     Active Member of Clubs or Organizations: Yes     Attends Club or Organization Meetings: More than 4 times per year     Marital Status:    Intimate Partner Violence: Not on file   Housing Stability: Low Risk  (6/20/2023)    Housing Stability Vital Sign     Unable to Pay for Housing in the Last Year: No     Number of Places Lived in the Last Year: 1     Unstable Housing in the Last Year: No     Allergies   Allergen Reactions    Nkda [No Known Drug Allergy]      Outpatient Encounter Medications as of 1/2/2024   Medication Sig Dispense Refill    meloxicam (MOBIC) 15 MG tablet Take 15 mg by mouth every day.      acyclovir (ZOVIRAX) 800 MG Tab Take 1 Tablet by mouth 2 times a day. 180 Tablet 0    rosuvastatin (CRESTOR) 10 MG Tab Take 1 Tablet by mouth every evening. 90 Tablet 2     [DISCONTINUED] benzonatate (TESSALON) 100 MG Cap Take 1 Capsule by mouth 3 times a day as needed for Cough. 60 Capsule 0    Eszopiclone (LUNESTA) 3 MG Tab Take 1 Tab by mouth at bedtime as needed. (Patient not taking: Reported on 12/20/2023) 90 Tab 0     No facility-administered encounter medications on file as of 1/2/2024.     Review of Systems   Constitutional: Negative.    HENT: Negative.     Eyes: Negative.    Respiratory:  Negative for shortness of breath.    Cardiovascular:  Negative for chest pain, palpitations, orthopnea, leg swelling and PND.   Gastrointestinal: Negative.    Genitourinary: Negative.    Musculoskeletal:  Positive for myalgias.   Skin: Negative.    Neurological: Negative.    Endo/Heme/Allergies: Negative.    Psychiatric/Behavioral:  Negative for depression. The patient is not nervous/anxious.               Objective     /64 (BP Location: Left arm, Patient Position: Sitting, BP Cuff Size: Adult)   Pulse (!) 54   Resp 16   Ht 1.829 m (6')   Wt 80.7 kg (178 lb)   SpO2 99%   BMI 24.14 kg/m²     Physical Exam  Constitutional:       Appearance: Normal appearance.   HENT:      Head: Normocephalic.   Neck:      Vascular: No JVD.   Cardiovascular:      Rate and Rhythm: Regular rhythm. Bradycardia present.      Pulses: Normal pulses.      Heart sounds: Normal heart sounds. No murmur heard.     No friction rub.   Pulmonary:      Effort: Pulmonary effort is normal.      Breath sounds: Normal breath sounds.   Abdominal:      Palpations: Abdomen is soft.   Musculoskeletal:         General: Normal range of motion.      Right lower leg: No edema.      Left lower leg: No edema.   Skin:     General: Skin is warm and dry.   Neurological:      General: No focal deficit present.      Mental Status: He is alert and oriented to person, place, and time.   Psychiatric:         Mood and Affect: Mood normal.         Behavior: Behavior normal.            Lab Results   Component Value Date/Time     CHOLSTRLTOT 148 06/05/2023 08:59 AM    LDL 66 06/05/2023 08:59 AM    HDL 68 06/05/2023 08:59 AM    TRIGLYCERIDE 68 06/05/2023 08:59 AM       Lab Results   Component Value Date/Time    SODIUM 139 07/03/2023 03:51 PM    POTASSIUM 4.1 07/03/2023 03:51 PM    CHLORIDE 103 07/03/2023 03:51 PM    CO2 24 07/03/2023 03:51 PM    GLUCOSE 84 07/03/2023 03:51 PM    BUN 20 07/03/2023 03:51 PM    CREATININE 0.97 07/03/2023 03:51 PM    CREATININE 1.18 10/22/2010 12:00 AM    BUNCREATRAT 23 10/22/2010 12:00 AM    GLOMRATE >59 10/22/2010 12:00 AM     Lab Results   Component Value Date/Time    ALKPHOSPHAT 73 06/05/2023 08:59 AM    ASTSGOT 32 06/05/2023 08:59 AM    ALTSGPT 27 06/05/2023 08:59 AM    TBILIRUBIN 1.2 06/05/2023 08:59 AM      Coronary calcium score 4/19/2022  Coronary calcification:  LMA - 0.0  LCX - 161.3  LAD - 325.4  RCA - 829.9  PDA - 0.0     Total Calcium Score: 1316.5    Assessment & Plan     1. Agatston CAC score, >400        2. Atherosclerosis of aorta (HCC)        3. Dyslipidemia        4. Sinus bradycardia            Medical Decision Making: Today's Assessment/Status/Plan:        Elevated coronary calcium score greater than 400  -Goal LDL level less than 70  -Start aspirin 81 mg daily once off of the meloxicam  -Denies any chest pain    Bradycardia  -Low resting heart rate, likely due to being physically active    Hyperlipidemia  -Most recent LDL 66   -Continue rosuvastatin 10 mg daily for now, when he is off of the meloxicam he will trial 1 week off of Crestor to see if this improves his muscle pains.  If it does we will consider stopping statins and starting him on a PSK 9 inhibitor  -Goal of less than 70  -Check lipid panel in annually  -I will check in with him in 6 weeks to see how his trial off of the Crestor has gone    Follow-up with RICA Hernández in 6 months    This note was dictated using Dragon speech recognition software.

## 2024-01-11 ENCOUNTER — OFFICE VISIT (OUTPATIENT)
Dept: MEDICAL GROUP | Age: 64
End: 2024-01-11
Payer: COMMERCIAL

## 2024-01-11 VITALS
RESPIRATION RATE: 16 BRPM | TEMPERATURE: 97 F | WEIGHT: 177 LBS | OXYGEN SATURATION: 98 % | HEART RATE: 70 BPM | BODY MASS INDEX: 23.98 KG/M2 | HEIGHT: 72 IN | SYSTOLIC BLOOD PRESSURE: 108 MMHG | DIASTOLIC BLOOD PRESSURE: 70 MMHG

## 2024-01-11 DIAGNOSIS — J01.00 ACUTE NON-RECURRENT MAXILLARY SINUSITIS: ICD-10-CM

## 2024-01-11 PROCEDURE — 3078F DIAST BP <80 MM HG: CPT | Performed by: PHYSICIAN ASSISTANT

## 2024-01-11 PROCEDURE — 99214 OFFICE O/P EST MOD 30 MIN: CPT | Performed by: PHYSICIAN ASSISTANT

## 2024-01-11 PROCEDURE — 3074F SYST BP LT 130 MM HG: CPT | Performed by: PHYSICIAN ASSISTANT

## 2024-01-11 RX ORDER — AMOXICILLIN AND CLAVULANATE POTASSIUM 875; 125 MG/1; MG/1
1 TABLET, FILM COATED ORAL 2 TIMES DAILY
Qty: 10 TABLET | Refills: 0 | Status: SHIPPED | OUTPATIENT
Start: 2024-01-11 | End: 2024-01-16

## 2024-01-11 RX ORDER — FLUTICASONE PROPIONATE 50 MCG
2 SPRAY, SUSPENSION (ML) NASAL DAILY
Qty: 16 ML | Refills: 0 | Status: SHIPPED | OUTPATIENT
Start: 2024-01-11 | End: 2024-02-02

## 2024-01-11 ASSESSMENT — PATIENT HEALTH QUESTIONNAIRE - PHQ9: CLINICAL INTERPRETATION OF PHQ2 SCORE: 0

## 2024-01-11 ASSESSMENT — FIBROSIS 4 INDEX: FIB4 SCORE: 1.99

## 2024-01-11 NOTE — PROGRESS NOTES
cc: Continued nasal congestion and sinus pressure    Subjective:     HPI  PCP-Dr. Vega, unable to see today  Joel Cobb is a 63 y.o. male presenting with concerns of continued nasal congestion, postnasal drip and cough that is been ongoing for the past 4 weeks.  He was seen in urgent care about 3 weeks ago, given Tessalon Perles.  The past 10 days has had fairly persistent sinus pressure as well.  He has been taking over-the-counter Mucinex, Mucinex spray, cold and flu with minimal improvement in symptoms.  Denies fever, chills, sweats, wheezing, cough, shortness of breath, sore throat        Review of systems:  See above.       Current Outpatient Medications:     amoxicillin-clavulanate (AUGMENTIN) 875-125 MG Tab, Take 1 Tablet by mouth 2 times a day for 5 days., Disp: 10 Tablet, Rfl: 0    fluticasone (FLONASE) 50 MCG/ACT nasal spray, Administer 2 Sprays into affected nostril(S) every day., Disp: 16 mL, Rfl: 0    meloxicam (MOBIC) 15 MG tablet, Take 15 mg by mouth every day., Disp: , Rfl:     acyclovir (ZOVIRAX) 800 MG Tab, Take 1 Tablet by mouth 2 times a day., Disp: 180 Tablet, Rfl: 0    rosuvastatin (CRESTOR) 10 MG Tab, Take 1 Tablet by mouth every evening., Disp: 90 Tablet, Rfl: 2    Eszopiclone (LUNESTA) 3 MG Tab, Take 1 Tab by mouth at bedtime as needed. (Patient not taking: Reported on 12/20/2023), Disp: 90 Tab, Rfl: 0    Allergies, past medical history, past surgical history, family history, social history reviewed and updated    Objective:     Vitals: /70 (BP Location: Left arm, Patient Position: Sitting, BP Cuff Size: Adult)   Pulse 70   Temp 36.1 °C (97 °F) (Temporal)   Resp 16   Ht 1.829 m (6')   Wt 80.3 kg (177 lb)   SpO2 98%   BMI 24.01 kg/m²   General: Alert, pleasant, NAD  HEENT: Normocephalic. Neck supple.  TMs gray, retracted bilaterally.  Mild right frontal and bilateral maxillary sinus pressure tenderness.  Mildly erythematous posterior pharynx.  No tonsillar enlargement  or exudate.  Uvula midline.  No thyromegaly or masses palpated. No cervical or supraclavicular lymphadenopathy. No carotid bruits   Heart: Regular rate and rhythm.  S1 and S2 normal.  No murmurs appreciated.  Respiratory: Normal respiratory effort.  Clear to auscultation bilaterally.  Skin: Warm, dry, no rashes.  Psych:  Affect/mood is normal, judgement is good, memory is intact, grooming is appropriate.    Assessment/Plan:      was seen today for nasal congestion, cough and runny nose.    Diagnoses and all orders for this visit:    Acute non-recurrent maxillary sinusitis  -Has been having continued sinus pressure for about 10 days now.  Go ahead and treat for sinusitis.  Starting on Augmentin, advised Flonase and nasal rinses.  Follow-up if minimal improvement in symptoms.  -     amoxicillin-clavulanate (AUGMENTIN) 875-125 MG Tab; Take 1 Tablet by mouth 2 times a day for 5 days.  -     fluticasone (FLONASE) 50 MCG/ACT nasal spray; Administer 2 Sprays into affected nostril(S) every day.        Return if symptoms worsen or fail to improve.

## 2024-02-02 DIAGNOSIS — J01.00 ACUTE NON-RECURRENT MAXILLARY SINUSITIS: ICD-10-CM

## 2024-02-02 RX ORDER — FLUTICASONE PROPIONATE 50 MCG
2 SPRAY, SUSPENSION (ML) NASAL DAILY
Qty: 48 ML | Refills: 1 | Status: SHIPPED | OUTPATIENT
Start: 2024-02-02 | End: 2024-02-09

## 2024-02-09 ENCOUNTER — OFFICE VISIT (OUTPATIENT)
Dept: MEDICAL GROUP | Age: 64
End: 2024-02-09
Payer: COMMERCIAL

## 2024-02-09 VITALS
TEMPERATURE: 97.7 F | WEIGHT: 177 LBS | SYSTOLIC BLOOD PRESSURE: 136 MMHG | HEIGHT: 72 IN | OXYGEN SATURATION: 97 % | HEART RATE: 74 BPM | BODY MASS INDEX: 23.98 KG/M2 | DIASTOLIC BLOOD PRESSURE: 68 MMHG

## 2024-02-09 DIAGNOSIS — J32.0 CHRONIC MAXILLARY SINUSITIS: ICD-10-CM

## 2024-02-09 PROCEDURE — 3075F SYST BP GE 130 - 139MM HG: CPT | Performed by: FAMILY MEDICINE

## 2024-02-09 PROCEDURE — 99214 OFFICE O/P EST MOD 30 MIN: CPT | Performed by: FAMILY MEDICINE

## 2024-02-09 PROCEDURE — 3078F DIAST BP <80 MM HG: CPT | Performed by: FAMILY MEDICINE

## 2024-02-09 RX ORDER — FLUTICASONE PROPIONATE 50 MCG
1 SPRAY, SUSPENSION (ML) NASAL DAILY
Qty: 16 G | Refills: 0 | Status: SHIPPED | OUTPATIENT
Start: 2024-02-09 | End: 2024-03-05

## 2024-02-09 RX ORDER — PREDNISONE 20 MG/1
TABLET ORAL
Qty: 12 TABLET | Refills: 0 | Status: SHIPPED | OUTPATIENT
Start: 2024-02-09

## 2024-02-09 ASSESSMENT — FIBROSIS 4 INDEX: FIB4 SCORE: 1.99

## 2024-02-09 NOTE — PROGRESS NOTES
This medical record contains text that has been entered with the assistance of computer voice recognition and dictation software.  Therefore, it may contain unintended errors in text, spelling, punctuation, or grammar      Chief Complaint   Patient presents with    Nasal Congestion    Ear Fullness     X since thanksgiving          Joel Cobb is a 63 y.o. male here evaluation and management of: Sinus congestion      HPI:           1. Chronic maxillary sinusitis  UNCONTROLLED CONDITION     is a very pleasant 63-year-old male who has been complaining of nasal sinus congestion frontal sinus congestion postnasal drip cough for the past 3 months almost.  He states there is also associated thick green discharge from bilateral nostrils.  He states before this started he was scuba diving in [place] he is not sure if that is what caused it.  However he has tried over-the-counter Mucinex Mucinex very cold and flu remedies with no improvement of his symptoms.  He was eventually placed on Augmentin Flonase instructed to do nasal saline rinsing he finished that course and still has same symptoms.  He states that he does not feel sick at all it is just very annoying that he is coughing and congested in public.    Current medicines (including changes today)  Current Outpatient Medications   Medication Sig Dispense Refill    fluticasone (FLONASE) 50 MCG/ACT nasal spray Administer 1 Spray into affected nostril(S) every day. 16 g 0    predniSONE (DELTASONE) 20 MG Tab Take 3 tabs po for 2 days then 2 tabs for 2 days then 1 for 2 days 12 Tablet 0    meloxicam (MOBIC) 15 MG tablet Take 15 mg by mouth every day.      acyclovir (ZOVIRAX) 800 MG Tab Take 1 Tablet by mouth 2 times a day. 180 Tablet 0    rosuvastatin (CRESTOR) 10 MG Tab Take 1 Tablet by mouth every evening. 90 Tablet 2     No current facility-administered medications for this visit.     He  has a past medical history of Cervical radiculopathy at C7 (10/10/2015),  HSV-2 infection (10/19/2010), Hyperlipemia, and Jet lag (10/19/2010).  He  has a past surgical history that includes vein stripping and prostatectomy, radical retro (10/22/14).  Social History     Tobacco Use    Smoking status: Never    Smokeless tobacco: Never   Vaping Use    Vaping Use: Never used   Substance Use Topics    Alcohol use: Yes     Alcohol/week: 0.5 - 7.0 oz     Types: 1 - 14 Cans of beer per week    Drug use: No     Social History     Social History Narrative    . Travels to [place], [place], [place] several times per year.      Family History   Problem Relation Age of Onset    Heart Disease Father         CHF    Stroke Father 60    Hypertension Father     Psychiatric Illness Paternal Uncle         Suicide    Cancer Maternal Aunt         lung     Family Status   Relation Name Status    Fa   at age 69        CHF, emphysema    Mo  Alive    PUnc  (Not Specified)    MAunt  (Not Specified)         ROS    The pertinent  ROS findings can be seen in the HPI above.     All other systems reviewed and are negative     Objective:     /68 (BP Location: Left arm, Patient Position: Sitting, BP Cuff Size: Adult)   Pulse 74   Temp 36.5 °C (97.7 °F) (Temporal)   Ht 1.829 m (6')   Wt 80.3 kg (177 lb)   SpO2 97%  Body mass index is 24.01 kg/m².      Physical Exam:    Constitutional: Alert, no distress.  Skin: No suspicious lesions  Eye: Equal, round and reactive, conjunctiva clear, lids normal.  ENMT: Lips without lesions, good dentition, oropharynx clear.  Frontal sinuses mildly tender to touch temporal region bilaterally tender to touch  Neck: Trachea midline, no masses, no thyromegaly. No cervical or supraclavicular lymphadenopathy.  Respiratory: Unlabored respiratory effort, lungs clear to auscultation, no wheezes, no ronchi.  Cardiovascular: Normal S1, S2, no murmur, no edema  Abdomen: Soft, non-tender, no masses, no hepatosplenomegaly.        Assessment and Plan:   The following treatment plan  was discussed    All recent labs and provider notes reviewed    1. Chronic maxillary sinusitis    We reviewed how to perform nasal saline rinsing followed by Flonase.  We will add a steroid burst and also obtain a CT maxillofacial view    - CT-MAXILLOFACIAL W/O PLUS RECONS; Future  - fluticasone (FLONASE) 50 MCG/ACT nasal spray; Administer 1 Spray into affected nostril(S) every day.  Dispense: 16 g; Refill: 0  - predniSONE (DELTASONE) 20 MG Tab; Take 3 tabs po for 2 days then 2 tabs for 2 days then 1 for 2 days  Dispense: 12 Tablet; Refill: 0  - Referral to ENT             Instructed to Follow up in clinic or ER for worsening symptoms, difficulty breathing, lack of expected recovery, or should new symptoms or problems arise.    Followup: Return in about 3 months (around 5/9/2024) for Reevaluation, labs.

## 2024-02-19 ENCOUNTER — HOSPITAL ENCOUNTER (OUTPATIENT)
Dept: RADIOLOGY | Facility: MEDICAL CENTER | Age: 64
End: 2024-02-19
Attending: FAMILY MEDICINE
Payer: COMMERCIAL

## 2024-02-19 DIAGNOSIS — J32.0 CHRONIC MAXILLARY SINUSITIS: ICD-10-CM

## 2024-02-19 PROCEDURE — 70486 CT MAXILLOFACIAL W/O DYE: CPT

## 2024-02-26 DIAGNOSIS — B00.9 HSV-2 INFECTION: ICD-10-CM

## 2024-02-27 RX ORDER — ACYCLOVIR 800 MG/1
800 TABLET ORAL 2 TIMES DAILY
Qty: 180 TABLET | Refills: 0 | Status: SHIPPED | OUTPATIENT
Start: 2024-02-27

## 2024-03-03 DIAGNOSIS — J32.0 CHRONIC MAXILLARY SINUSITIS: ICD-10-CM

## 2024-03-03 NOTE — TELEPHONE ENCOUNTER
Received request via: Patient    Was the patient seen in the last year in this department? Yes    Does the patient have an active prescription (recently filled or refills available) for medication(s) requested? No    Pharmacy Name: [facility]     Does the patient have nursing home Plus and need 100 day supply (blood pressure, diabetes and cholesterol meds only)? Patient does not have SCP

## 2024-03-05 RX ORDER — FLUTICASONE PROPIONATE 50 MCG
1 SPRAY, SUSPENSION (ML) NASAL DAILY
Qty: 48 ML | Refills: 1 | Status: SHIPPED | OUTPATIENT
Start: 2024-03-05

## 2024-05-03 DIAGNOSIS — R00.1 SINUS BRADYCARDIA: ICD-10-CM

## 2024-05-03 RX ORDER — ROSUVASTATIN CALCIUM 10 MG/1
10 TABLET, COATED ORAL EVERY EVENING
Qty: 90 TABLET | Refills: 2 | Status: SHIPPED | OUTPATIENT
Start: 2024-05-03

## 2024-05-03 NOTE — TELEPHONE ENCOUNTER
Is the patient due for a refill? Yes    Was the patient seen the past year? Yes    Date of last office visit: 1/2/24    Does the patient have an upcoming appointment?  Yes   If yes, When? 7/2/24    Provider to refill:LH    Does the patients insurance require a 100 day supply?  No

## 2024-05-22 ENCOUNTER — PATIENT MESSAGE (OUTPATIENT)
Dept: CARDIOLOGY | Facility: MEDICAL CENTER | Age: 64
End: 2024-05-22
Payer: COMMERCIAL

## 2024-05-22 NOTE — TELEPHONE ENCOUNTER
Lets try every other day dosing with the Crestor and see if that does not improve his muscle pains

## 2024-06-13 ENCOUNTER — OFFICE VISIT (OUTPATIENT)
Dept: CARDIOLOGY | Facility: MEDICAL CENTER | Age: 64
End: 2024-06-13
Payer: COMMERCIAL

## 2024-06-13 VITALS
HEART RATE: 55 BPM | DIASTOLIC BLOOD PRESSURE: 72 MMHG | RESPIRATION RATE: 16 BRPM | WEIGHT: 172 LBS | HEIGHT: 72 IN | OXYGEN SATURATION: 99 % | SYSTOLIC BLOOD PRESSURE: 128 MMHG | BODY MASS INDEX: 23.3 KG/M2

## 2024-06-13 DIAGNOSIS — E78.5 DYSLIPIDEMIA: ICD-10-CM

## 2024-06-13 DIAGNOSIS — R00.1 SINUS BRADYCARDIA: ICD-10-CM

## 2024-06-13 DIAGNOSIS — I70.0 ATHEROSCLEROSIS OF AORTA (HCC): ICD-10-CM

## 2024-06-13 DIAGNOSIS — R93.1 AGATSTON CAC SCORE, >400: ICD-10-CM

## 2024-06-13 PROCEDURE — 3078F DIAST BP <80 MM HG: CPT

## 2024-06-13 PROCEDURE — 99211 OFF/OP EST MAY X REQ PHY/QHP: CPT

## 2024-06-13 PROCEDURE — 3074F SYST BP LT 130 MM HG: CPT

## 2024-06-13 PROCEDURE — 99214 OFFICE O/P EST MOD 30 MIN: CPT

## 2024-06-13 RX ORDER — ASPIRIN 81 MG/1
81 TABLET, CHEWABLE ORAL DAILY
Qty: 100 TABLET | Refills: 3 | Status: SHIPPED | OUTPATIENT
Start: 2024-06-13

## 2024-06-13 ASSESSMENT — ENCOUNTER SYMPTOMS
GASTROINTESTINAL NEGATIVE: 1
MUSCULOSKELETAL NEGATIVE: 1
NERVOUS/ANXIOUS: 0
PND: 0
SHORTNESS OF BREATH: 0
EYES NEGATIVE: 1
CONSTITUTIONAL NEGATIVE: 1
ORTHOPNEA: 0
PALPITATIONS: 0
NEUROLOGICAL NEGATIVE: 1
DEPRESSION: 0

## 2024-06-13 ASSESSMENT — FIBROSIS 4 INDEX: FIB4 SCORE: 1.99

## 2024-06-13 NOTE — PROGRESS NOTES
Chief Complaint   Patient presents with    Other     Agatston CAC score, >400       Subjective     Joel Cobb is a 63 y.o. male who presents today  for follow up. They have a history of elevated coronary calcium score and bradycardia.     Seen by myself on 1/2/2024 he has been having muscle pain that he believes to be associated with his Crestor.  He was recommended to trial every other day dosing of the Crestor to see if that improved his myalgias.  He has also recently been started on meloxicam for shoulder pain which has helped with his muscle pains.  Very active, exercises regularly    Today 6/13/2024 he stays very active as a runner and goes to the gym. His myalgias have improved greatly with every other day dosing of his Crestor    Past Medical History:   Diagnosis Date    Cervical radiculopathy at C7 10/10/2015    HSV-2 infection 10/19/2010    Hyperlipemia     Jet lag 10/19/2010     Past Surgical History:   Procedure Laterality Date    PROSTATECTOMY, RADICAL RETRO  10/22/14    MD Elder    VEIN STRIPPING      bilateral leg varicose veins     Family History   Problem Relation Age of Onset    Heart Disease Father         CHF    Stroke Father 60    Hypertension Father     Psychiatric Illness Paternal Uncle         Suicide    Cancer Maternal Aunt         lung     Social History     Socioeconomic History    Marital status:      Spouse name: Not on file    Number of children: 0    Years of education: Not on file    Highest education level: Master's degree (e.g., MA, MS, Chris, MEd, MSW, KATTY)   Occupational History    Occupation:      Employer: OTHER     Comment: [employer]   Tobacco Use    Smoking status: Never    Smokeless tobacco: Never   Vaping Use    Vaping status: Never Used   Substance and Sexual Activity    Alcohol use: Yes     Alcohol/week: 0.5 - 7.0 oz     Types: 1 - 14 Cans of beer per week    Drug use: No    Sexual activity: Yes     Partners: Female   Other Topics Concern    Not on file    Social History Narrative    . Travels to [place], [place], [place] several times per year.      Social Determinants of Health     Financial Resource Strain: Low Risk  (6/20/2023)    Overall Financial Resource Strain (CARDIA)     Difficulty of Paying Living Expenses: Not hard at all   Food Insecurity: No Food Insecurity (6/20/2023)    Hunger Vital Sign     Worried About Running Out of Food in the Last Year: Never true     Ran Out of Food in the Last Year: Never true   Transportation Needs: No Transportation Needs (6/20/2023)    PRAPARE - Transportation     Lack of Transportation (Medical): No     Lack of Transportation (Non-Medical): No   Physical Activity: Sufficiently Active (6/20/2023)    Exercise Vital Sign     Days of Exercise per Week: 6 days     Minutes of Exercise per Session: 80 min   Stress: Stress Concern Present (6/20/2023)    [employer] - Occupational Stress Questionnaire     Feeling of Stress : To some extent   Social Connections: Unknown (6/20/2023)    Social Connection and Isolation Panel [NHANES]     Frequency of Communication with Friends and Family: Twice a week     Frequency of Social Gatherings with Friends and Family: Twice a week     Attends [employer] Services: Patient declined     Active Member of Clubs or Organizations: Yes     Attends Club or Organization Meetings: More than 4 times per year     Marital Status:    Intimate Partner Violence: Not on file   Housing Stability: Low Risk  (6/20/2023)    Housing Stability Vital Sign     Unable to Pay for Housing in the Last Year: No     Number of Places Lived in the Last Year: 1     Unstable Housing in the Last Year: No     Allergies   Allergen Reactions    Nkda [No Known Drug Allergy]      Outpatient Encounter Medications as of 6/13/2024   Medication Sig Dispense Refill    aspirin (ASA) 81 MG Chew Tab chewable tablet Chew 1 Tablet every day. 100 Tablet 3    rosuvastatin (CRESTOR) 10 MG Tab Take 1 Tablet by  mouth every evening. 90 Tablet 2    fluticasone (FLONASE) 50 MCG/ACT nasal spray ADMINISTER 1 SPRAY INTO AFFECTED NOSTRIL(S) EVERY DAY. 48 mL 1    acyclovir (ZOVIRAX) 800 MG Tab TAKE 1 TABLET BY MOUTH TWICE A  Tablet 0    [DISCONTINUED] predniSONE (DELTASONE) 20 MG Tab Take 3 tabs po for 2 days then 2 tabs for 2 days then 1 for 2 days 12 Tablet 0    [DISCONTINUED] meloxicam (MOBIC) 15 MG tablet Take 15 mg by mouth every day.       No facility-administered encounter medications on file as of 6/13/2024.     Review of Systems   Constitutional: Negative.    HENT: Negative.     Eyes: Negative.    Respiratory:  Negative for shortness of breath.    Cardiovascular:  Negative for chest pain, palpitations, orthopnea, leg swelling and PND.   Gastrointestinal: Negative.    Genitourinary: Negative.    Musculoskeletal: Negative.    Skin: Negative.    Neurological: Negative.    Endo/Heme/Allergies: Negative.    Psychiatric/Behavioral:  Negative for depression. The patient is not nervous/anxious.               Objective     /72 (BP Location: Left arm, Patient Position: Sitting, BP Cuff Size: Adult)   Pulse (!) 55   Resp 16   Ht 1.829 m (6')   Wt 78 kg (172 lb)   SpO2 99%   BMI 23.33 kg/m²     Physical Exam  Constitutional:       Appearance: Normal appearance.   HENT:      Head: Normocephalic.   Neck:      Vascular: No JVD.   Cardiovascular:      Rate and Rhythm: Normal rate and regular rhythm.      Pulses: Normal pulses.      Heart sounds: Normal heart sounds. No murmur heard.     No friction rub.   Pulmonary:      Effort: Pulmonary effort is normal.      Breath sounds: Normal breath sounds.   Abdominal:      Palpations: Abdomen is soft.   Musculoskeletal:         General: Normal range of motion.      Right lower leg: No edema.      Left lower leg: No edema.   Skin:     General: Skin is warm and dry.   Neurological:      General: No focal deficit present.      Mental Status: He is alert and oriented to person,  place, and time.   Psychiatric:         Mood and Affect: Mood normal.         Behavior: Behavior normal.            Lab Results   Component Value Date/Time    CHOLSTRLTOT 148 06/05/2023 08:59 AM    LDL 66 06/05/2023 08:59 AM    HDL 68 06/05/2023 08:59 AM    TRIGLYCERIDE 68 06/05/2023 08:59 AM       Lab Results   Component Value Date/Time    SODIUM 139 07/03/2023 03:51 PM    POTASSIUM 4.1 07/03/2023 03:51 PM    CHLORIDE 103 07/03/2023 03:51 PM    CO2 24 07/03/2023 03:51 PM    GLUCOSE 84 07/03/2023 03:51 PM    BUN 20 07/03/2023 03:51 PM    CREATININE 0.97 07/03/2023 03:51 PM    CREATININE 1.18 10/22/2010 12:00 AM    BUNCREATRAT 23 10/22/2010 12:00 AM    GLOMRATE >59 10/22/2010 12:00 AM     Lab Results   Component Value Date/Time    ALKPHOSPHAT 73 06/05/2023 08:59 AM    ASTSGOT 32 06/05/2023 08:59 AM    ALTSGPT 27 06/05/2023 08:59 AM    TBILIRUBIN 1.2 06/05/2023 08:59 AM      Coronary calcium score 4/19/2022  Coronary calcification:  LMA - 0.0  LCX - 161.3  LAD - 325.4  RCA - 829.9  PDA - 0.0     Total Calcium Score: 1316.5    Assessment & Plan     1. Agatston CAC score, >400  aspirin (ASA) 81 MG Chew Tab chewable tablet      2. Atherosclerosis of aorta (HCC)        3. Dyslipidemia  Lipid Profile      4. Sinus bradycardia            Medical Decision Making: Today's Assessment/Status/Plan:        Elevated coronary calcium score greater than 400  -Goal LDL level less than 70  -Start aspirin 81 mg daily   -Denies any chest pain or shortness of breath with activity     Bradycardia  -Low resting heart rate, likely due to being physically active     Hyperlipidemia  -Most recent LDL 66   -Continue with every other day dosing of rosuvastatin 10 mg  -Goal of less than 70  -Recheck lipid panel     Follow-up with RICA Hernández in 6 months     This note was dictated using Dragon speech recognition software.

## 2024-06-22 ENCOUNTER — HOSPITAL ENCOUNTER (OUTPATIENT)
Dept: LAB | Facility: MEDICAL CENTER | Age: 64
End: 2024-06-22
Payer: COMMERCIAL

## 2024-06-22 DIAGNOSIS — E78.5 DYSLIPIDEMIA: ICD-10-CM

## 2024-06-22 LAB
CHOLEST SERPL-MCNC: 141 MG/DL (ref 100–199)
HDLC SERPL-MCNC: 52 MG/DL
LDLC SERPL CALC-MCNC: 72 MG/DL
TRIGL SERPL-MCNC: 86 MG/DL (ref 0–149)

## 2024-06-22 PROCEDURE — 36415 COLL VENOUS BLD VENIPUNCTURE: CPT

## 2024-06-22 PROCEDURE — 80061 LIPID PANEL: CPT

## 2024-06-24 ENCOUNTER — PATIENT MESSAGE (OUTPATIENT)
Dept: CARDIOLOGY | Facility: MEDICAL CENTER | Age: 64
End: 2024-06-24
Payer: COMMERCIAL

## 2024-06-24 ENCOUNTER — TELEPHONE (OUTPATIENT)
Dept: CARDIOLOGY | Facility: MEDICAL CENTER | Age: 64
End: 2024-06-24
Payer: COMMERCIAL

## 2024-06-24 DIAGNOSIS — R93.1 AGATSTON CAC SCORE, >400: ICD-10-CM

## 2024-06-24 DIAGNOSIS — E78.5 DYSLIPIDEMIA: ICD-10-CM

## 2024-06-24 NOTE — TELEPHONE ENCOUNTER
----- Message from Elba Hardy R.N. sent at 6/24/2024 11:32 AM PDT -----  Fareed Bullock! LH routed to me in error, with two others I'll send as well if you could follow up :)   Thank you!    ----- Message -----  From: ALEKSANDR Grant  Sent: 6/24/2024  10:30 AM PDT  To: Elba Hardy R.N.    His LDL level is close to our goal of less than 70.  I would recommend that he consider adding on ezetimibe 10 mg daily as an adjunct therapy.  If he is not willing to add on additional medication we can just continue with her current regimen.  If he is willing to try the ezetimibe I would recommend rechecking his lipid panel in 3 months

## 2024-06-25 RX ORDER — EZETIMIBE 10 MG/1
10 TABLET ORAL DAILY
Qty: 90 TABLET | Refills: 3 | Status: SHIPPED | OUTPATIENT
Start: 2024-06-25

## 2024-06-26 ENCOUNTER — TELEPHONE (OUTPATIENT)
Dept: CARDIOLOGY | Facility: MEDICAL CENTER | Age: 64
End: 2024-06-26
Payer: COMMERCIAL

## 2024-06-26 NOTE — TELEPHONE ENCOUNTER
PA Started:  Joel Cobb ([place]: [id number])  PA Case ID #: [id number]  Rx #: [id number]  Need Help? Call us at [phone]  Status  Additional Information Required  Drug  Ezetimibe 10MG tablets  ePA cloud logo  Form  [place] [employer] Electronic PA Form (2017 NCPDP)  Original Claim Info  75,76 PLAN PREFERS GENERICSTEP THERAPY NOT METDRUG REQUIRES PRIOR AUTHORIZATION

## 2024-06-26 NOTE — TELEPHONE ENCOUNTER
PA approved through :    Joel Cobb (Key: [id number])  PA Case ID #: [id number]  Rx #: [id number]  Need Help? Call us at [phone]  Outcome  Approved today  PA Case: [id number], Status: Approved, Coverage Starts on: 6/26/2024 12:00:00 AM, Coverage Ends on: 6/26/2025 12:00:00 AM.  Authorization Expiration Date: 6/25/2025  Drug  Ezetimibe 10MG tablets  ePA cloud logo  Form  [place] Commercial Electronic PA Form (2017 NCPDP)  Original Claim Info  75,76 PLAN PREFERS GENERICSTEP THERAPY NOT METDRUG REQUIRES PRIOR AUTHORIZATION

## 2024-07-26 ENCOUNTER — OFFICE VISIT (OUTPATIENT)
Dept: URGENT CARE | Facility: CLINIC | Age: 64
End: 2024-07-26
Payer: COMMERCIAL

## 2024-07-26 VITALS
HEART RATE: 64 BPM | SYSTOLIC BLOOD PRESSURE: 112 MMHG | RESPIRATION RATE: 16 BRPM | OXYGEN SATURATION: 99 % | DIASTOLIC BLOOD PRESSURE: 74 MMHG | WEIGHT: 170 LBS | HEIGHT: 73 IN | TEMPERATURE: 97.8 F | BODY MASS INDEX: 22.53 KG/M2

## 2024-07-26 DIAGNOSIS — Z48.02 VISIT FOR SUTURE REMOVAL: ICD-10-CM

## 2024-07-26 PROCEDURE — 3078F DIAST BP <80 MM HG: CPT | Performed by: NURSE PRACTITIONER

## 2024-07-26 PROCEDURE — 99212 OFFICE O/P EST SF 10 MIN: CPT | Performed by: NURSE PRACTITIONER

## 2024-07-26 PROCEDURE — 3074F SYST BP LT 130 MM HG: CPT | Performed by: NURSE PRACTITIONER

## 2024-07-26 ASSESSMENT — FIBROSIS 4 INDEX: FIB4 SCORE: 1.99

## 2024-12-06 ENCOUNTER — TELEPHONE (OUTPATIENT)
Dept: MEDICAL GROUP | Age: 64
End: 2024-12-06
Payer: COMMERCIAL

## 2024-12-06 DIAGNOSIS — E78.2 MIXED HYPERLIPIDEMIA: ICD-10-CM

## 2024-12-06 DIAGNOSIS — N40.0 BENIGN PROSTATIC HYPERPLASIA WITHOUT LOWER URINARY TRACT SYMPTOMS: ICD-10-CM

## 2024-12-06 DIAGNOSIS — Z00.00 ANNUAL PHYSICAL EXAM: ICD-10-CM

## 2024-12-24 ENCOUNTER — HOSPITAL ENCOUNTER (OUTPATIENT)
Dept: LAB | Facility: MEDICAL CENTER | Age: 64
End: 2024-12-24
Attending: FAMILY MEDICINE
Payer: COMMERCIAL

## 2024-12-24 DIAGNOSIS — E78.5 DYSLIPIDEMIA: ICD-10-CM

## 2024-12-24 DIAGNOSIS — E78.2 MIXED HYPERLIPIDEMIA: ICD-10-CM

## 2024-12-24 DIAGNOSIS — Z00.00 ANNUAL PHYSICAL EXAM: ICD-10-CM

## 2024-12-24 DIAGNOSIS — N40.0 BENIGN PROSTATIC HYPERPLASIA WITHOUT LOWER URINARY TRACT SYMPTOMS: ICD-10-CM

## 2024-12-24 DIAGNOSIS — R93.1 AGATSTON CAC SCORE, >400: ICD-10-CM

## 2024-12-24 LAB
ALBUMIN SERPL BCP-MCNC: 4 G/DL (ref 3.2–4.9)
ALBUMIN/GLOB SERPL: 1.8 G/DL
ALP SERPL-CCNC: 74 U/L (ref 30–99)
ALT SERPL-CCNC: 35 U/L (ref 2–50)
ANION GAP SERPL CALC-SCNC: 9 MMOL/L (ref 7–16)
AST SERPL-CCNC: 40 U/L (ref 12–45)
BASOPHILS # BLD AUTO: 1.5 % (ref 0–1.8)
BASOPHILS # BLD: 0.05 K/UL (ref 0–0.12)
BILIRUB SERPL-MCNC: 1.3 MG/DL (ref 0.1–1.5)
BUN SERPL-MCNC: 15 MG/DL (ref 8–22)
CALCIUM ALBUM COR SERPL-MCNC: 9.2 MG/DL (ref 8.5–10.5)
CALCIUM SERPL-MCNC: 9.2 MG/DL (ref 8.5–10.5)
CHLORIDE SERPL-SCNC: 105 MMOL/L (ref 96–112)
CHOLEST SERPL-MCNC: 156 MG/DL (ref 100–199)
CO2 SERPL-SCNC: 26 MMOL/L (ref 20–33)
CREAT SERPL-MCNC: 1.19 MG/DL (ref 0.5–1.4)
EOSINOPHIL # BLD AUTO: 0.11 K/UL (ref 0–0.51)
EOSINOPHIL NFR BLD: 3.3 % (ref 0–6.9)
ERYTHROCYTE [DISTWIDTH] IN BLOOD BY AUTOMATED COUNT: 46.8 FL (ref 35.9–50)
FASTING STATUS PATIENT QL REPORTED: NORMAL
GFR SERPLBLD CREATININE-BSD FMLA CKD-EPI: 69 ML/MIN/1.73 M 2
GLOBULIN SER CALC-MCNC: 2.2 G/DL (ref 1.9–3.5)
GLUCOSE SERPL-MCNC: 91 MG/DL (ref 65–99)
HCT VFR BLD AUTO: 43.3 % (ref 42–52)
HDLC SERPL-MCNC: 62 MG/DL
HGB BLD-MCNC: 14.2 G/DL (ref 14–18)
HIV 1+2 AB+HIV1 P24 AG SERPL QL IA: NORMAL
IMM GRANULOCYTES # BLD AUTO: 0 K/UL (ref 0–0.11)
IMM GRANULOCYTES NFR BLD AUTO: 0 % (ref 0–0.9)
LDLC SERPL CALC-MCNC: 79 MG/DL
LYMPHOCYTES # BLD AUTO: 1.15 K/UL (ref 1–4.8)
LYMPHOCYTES NFR BLD: 34 % (ref 22–41)
MCH RBC QN AUTO: 30.6 PG (ref 27–33)
MCHC RBC AUTO-ENTMCNC: 32.8 G/DL (ref 32.3–36.5)
MCV RBC AUTO: 93.3 FL (ref 81.4–97.8)
MONOCYTES # BLD AUTO: 0.42 K/UL (ref 0–0.85)
MONOCYTES NFR BLD AUTO: 12.4 % (ref 0–13.4)
NEUTROPHILS # BLD AUTO: 1.65 K/UL (ref 1.82–7.42)
NEUTROPHILS NFR BLD: 48.8 % (ref 44–72)
NRBC # BLD AUTO: 0 K/UL
NRBC BLD-RTO: 0 /100 WBC (ref 0–0.2)
PLATELET # BLD AUTO: 206 K/UL (ref 164–446)
PMV BLD AUTO: 10.1 FL (ref 9–12.9)
POTASSIUM SERPL-SCNC: 4.7 MMOL/L (ref 3.6–5.5)
PROT SERPL-MCNC: 6.2 G/DL (ref 6–8.2)
PSA SERPL DL<=0.01 NG/ML-MCNC: <0.02 NG/ML (ref 0–4)
RBC # BLD AUTO: 4.64 M/UL (ref 4.7–6.1)
SODIUM SERPL-SCNC: 140 MMOL/L (ref 135–145)
T3FREE SERPL-MCNC: 3.3 PG/ML (ref 2–4.4)
T4 FREE SERPL-MCNC: 1.13 NG/DL (ref 0.93–1.7)
TRIGL SERPL-MCNC: 75 MG/DL (ref 0–149)
TSH SERPL-ACNC: 2.37 UIU/ML (ref 0.35–5.5)
WBC # BLD AUTO: 3.4 K/UL (ref 4.8–10.8)

## 2024-12-24 PROCEDURE — 84443 ASSAY THYROID STIM HORMONE: CPT

## 2024-12-24 PROCEDURE — 84153 ASSAY OF PSA TOTAL: CPT

## 2024-12-24 PROCEDURE — 84439 ASSAY OF FREE THYROXINE: CPT

## 2024-12-24 PROCEDURE — 84481 FREE ASSAY (FT-3): CPT

## 2024-12-24 PROCEDURE — 80053 COMPREHEN METABOLIC PANEL: CPT

## 2024-12-24 PROCEDURE — 87389 HIV-1 AG W/HIV-1&-2 AB AG IA: CPT

## 2024-12-24 PROCEDURE — 36415 COLL VENOUS BLD VENIPUNCTURE: CPT

## 2024-12-24 PROCEDURE — 80061 LIPID PANEL: CPT

## 2024-12-24 PROCEDURE — 85025 COMPLETE CBC W/AUTO DIFF WBC: CPT

## 2024-12-30 ENCOUNTER — TELEPHONE (OUTPATIENT)
Dept: CARDIOLOGY | Facility: MEDICAL CENTER | Age: 64
End: 2024-12-30
Payer: COMMERCIAL

## 2024-12-30 DIAGNOSIS — E78.5 DYSLIPIDEMIA: ICD-10-CM

## 2024-12-30 NOTE — TELEPHONE ENCOUNTER
----- Message from Nurse Practitioner ALEKSANDR Dubois sent at 12/30/2024  8:24 AM PST -----  LDL level is above goal, recommend adding ezetimibe 10 mg daily and recheck lipids in 3 months

## 2024-12-30 NOTE — TELEPHONE ENCOUNTER
Phone Number Called: [phone]    Call outcome: Did not leave a detailed message. Requested patient to call back.

## 2024-12-30 NOTE — TELEPHONE ENCOUNTER
Phone Number Called: [phone]      Call outcome: Spoke to patient regarding message below.    Message: Called to inform patient of 's recommendations.     To , pt is currently on ezetimide 10mg daily and taking rosuvastatin 10 mg every other day. Any other recommendations? ~thank you       Lipid panel ordered for 3 months.

## 2024-12-31 NOTE — TELEPHONE ENCOUNTER
Phone Number Called: [phone]    Call outcome: Did not leave a detailed message. Requested patient to call back.      [employer] message sent to pt.

## 2025-01-14 ENCOUNTER — APPOINTMENT (OUTPATIENT)
Dept: MEDICAL GROUP | Age: 65
End: 2025-01-14
Payer: COMMERCIAL

## 2025-02-13 SDOH — ECONOMIC STABILITY: INCOME INSECURITY: IN THE LAST 12 MONTHS, WAS THERE A TIME WHEN YOU WERE NOT ABLE TO PAY THE MORTGAGE OR RENT ON TIME?: NO

## 2025-02-13 SDOH — ECONOMIC STABILITY: FOOD INSECURITY: WITHIN THE PAST 12 MONTHS, YOU WORRIED THAT YOUR FOOD WOULD RUN OUT BEFORE YOU GOT MONEY TO BUY MORE.: NEVER TRUE

## 2025-02-13 SDOH — ECONOMIC STABILITY: FOOD INSECURITY: WITHIN THE PAST 12 MONTHS, THE FOOD YOU BOUGHT JUST DIDN'T LAST AND YOU DIDN'T HAVE MONEY TO GET MORE.: NEVER TRUE

## 2025-02-13 SDOH — HEALTH STABILITY: PHYSICAL HEALTH: ON AVERAGE, HOW MANY MINUTES DO YOU ENGAGE IN EXERCISE AT THIS LEVEL?: 80 MIN

## 2025-02-13 SDOH — ECONOMIC STABILITY: INCOME INSECURITY: HOW HARD IS IT FOR YOU TO PAY FOR THE VERY BASICS LIKE FOOD, HOUSING, MEDICAL CARE, AND HEATING?: NOT HARD AT ALL

## 2025-02-13 SDOH — HEALTH STABILITY: PHYSICAL HEALTH: ON AVERAGE, HOW MANY DAYS PER WEEK DO YOU ENGAGE IN MODERATE TO STRENUOUS EXERCISE (LIKE A BRISK WALK)?: 6 DAYS

## 2025-02-13 ASSESSMENT — SOCIAL DETERMINANTS OF HEALTH (SDOH)
IN A TYPICAL WEEK, HOW MANY TIMES DO YOU TALK ON THE PHONE WITH FAMILY, FRIENDS, OR NEIGHBORS?: TWICE A WEEK
HOW OFTEN DO YOU GET TOGETHER WITH FRIENDS OR RELATIVES?: ONCE A WEEK
HOW OFTEN DO YOU ATTENT MEETINGS OF THE CLUB OR ORGANIZATION YOU BELONG TO?: 1 TO 4 TIMES PER YEAR
HOW OFTEN DO YOU GET TOGETHER WITH FRIENDS OR RELATIVES?: ONCE A WEEK
HOW OFTEN DO YOU ATTEND CHURCH OR RELIGIOUS SERVICES?: PATIENT DECLINED
HOW HARD IS IT FOR YOU TO PAY FOR THE VERY BASICS LIKE FOOD, HOUSING, MEDICAL CARE, AND HEATING?: NOT HARD AT ALL
HOW OFTEN DO YOU HAVE SIX OR MORE DRINKS ON ONE OCCASION: LESS THAN MONTHLY
WITHIN THE PAST 12 MONTHS, YOU WORRIED THAT YOUR FOOD WOULD RUN OUT BEFORE YOU GOT THE MONEY TO BUY MORE: NEVER TRUE
DO YOU BELONG TO ANY CLUBS OR ORGANIZATIONS SUCH AS CHURCH GROUPS UNIONS, FRATERNAL OR ATHLETIC GROUPS, OR SCHOOL GROUPS?: YES
IN THE PAST 12 MONTHS, HAS THE ELECTRIC, GAS, OIL, OR WATER COMPANY THREATENED TO SHUT OFF SERVICE IN YOUR HOME?: NO
DO YOU BELONG TO ANY CLUBS OR ORGANIZATIONS SUCH AS CHURCH GROUPS UNIONS, FRATERNAL OR ATHLETIC GROUPS, OR SCHOOL GROUPS?: YES
HOW OFTEN DO YOU ATTENT MEETINGS OF THE CLUB OR ORGANIZATION YOU BELONG TO?: 1 TO 4 TIMES PER YEAR
HOW MANY DRINKS CONTAINING ALCOHOL DO YOU HAVE ON A TYPICAL DAY WHEN YOU ARE DRINKING: 1 OR 2
HOW OFTEN DO YOU HAVE A DRINK CONTAINING ALCOHOL: 2-4 TIMES A MONTH
IN A TYPICAL WEEK, HOW MANY TIMES DO YOU TALK ON THE PHONE WITH FAMILY, FRIENDS, OR NEIGHBORS?: TWICE A WEEK
HOW OFTEN DO YOU ATTEND CHURCH OR RELIGIOUS SERVICES?: PATIENT DECLINED

## 2025-02-13 ASSESSMENT — LIFESTYLE VARIABLES
HOW OFTEN DO YOU HAVE SIX OR MORE DRINKS ON ONE OCCASION: LESS THAN MONTHLY
SKIP TO QUESTIONS 9-10: 0
HOW MANY STANDARD DRINKS CONTAINING ALCOHOL DO YOU HAVE ON A TYPICAL DAY: 1 OR 2
AUDIT-C TOTAL SCORE: 3
HOW OFTEN DO YOU HAVE A DRINK CONTAINING ALCOHOL: 2-4 TIMES A MONTH

## 2025-02-14 ENCOUNTER — APPOINTMENT (OUTPATIENT)
Dept: MEDICAL GROUP | Age: 65
End: 2025-02-14
Payer: COMMERCIAL

## 2025-02-14 VITALS
BODY MASS INDEX: 24.12 KG/M2 | SYSTOLIC BLOOD PRESSURE: 126 MMHG | DIASTOLIC BLOOD PRESSURE: 80 MMHG | OXYGEN SATURATION: 96 % | TEMPERATURE: 97.5 F | HEART RATE: 70 BPM | HEIGHT: 73 IN | WEIGHT: 182 LBS

## 2025-02-14 DIAGNOSIS — Z12.11 SCREENING FOR COLON CANCER: ICD-10-CM

## 2025-02-14 DIAGNOSIS — D70.8 OTHER NEUTROPENIA (HCC): ICD-10-CM

## 2025-02-14 DIAGNOSIS — Z23 NEED FOR VACCINATION: ICD-10-CM

## 2025-02-14 DIAGNOSIS — Z00.00 ANNUAL PHYSICAL EXAM: ICD-10-CM

## 2025-02-14 PROCEDURE — 3079F DIAST BP 80-89 MM HG: CPT | Performed by: FAMILY MEDICINE

## 2025-02-14 PROCEDURE — 3074F SYST BP LT 130 MM HG: CPT | Performed by: FAMILY MEDICINE

## 2025-02-14 PROCEDURE — 99396 PREV VISIT EST AGE 40-64: CPT | Mod: 25 | Performed by: FAMILY MEDICINE

## 2025-02-14 PROCEDURE — 90471 IMMUNIZATION ADMIN: CPT | Performed by: FAMILY MEDICINE

## 2025-02-14 PROCEDURE — 90677 PCV20 VACCINE IM: CPT | Performed by: FAMILY MEDICINE

## 2025-02-14 ASSESSMENT — FIBROSIS 4 INDEX: FIB4 SCORE: 2.07

## 2025-02-14 ASSESSMENT — PATIENT HEALTH QUESTIONNAIRE - PHQ9: CLINICAL INTERPRETATION OF PHQ2 SCORE: 0

## 2025-02-14 NOTE — PROGRESS NOTES
Subjective:     CC:   Chief Complaint   Patient presents with    Annual Exam     Annual - physical        HPI:   Joel Cobb is a 64 y.o. male who presents for the following    1. Annual physical exam  2. Screening for colon cancer  3. Other neutropenia (HCC)  4. Need for vaccination  History of Present Illness  The patient is a 64-year-old male who presents for a colonoscopy referral.    He has expressed concerns regarding his recent laboratory results, specifically noting abnormalities in his red blood cell (RBC) and white blood cell (WBC) counts. He reports no systemic symptoms such as fevers, chills, or night sweats.    He has a history of early-onset prostate cancer, which is currently in remission. He has never undergone testosterone therapy. He maintains an active lifestyle, engaging in running activities approximately 20 to 25 miles per week.    He is due for pneumonia vaccine. He does not have any exposure to blood products at work or anywhere.      IMMUNIZATIONS  He is due for pneumonia vaccine.          Last Colorectal Cancer Screening: UTD  Last Tdap: UTD  Received HPV series: No  Hx STDs: No    Exercise: strenuous regular exercise, aerobic > 3 hours a week, runs 20-30miles per week  Diet: regular      He  has a past medical history of Cervical radiculopathy at C7 (10/10/2015), HSV-2 infection (10/19/2010), Hyperlipemia, and Jet lag (10/19/2010).  He  has a past surgical history that includes vein stripping and prostatectomy, radical retro (10/22/14).    Family History   Problem Relation Age of Onset    Heart Disease Father         CHF    Stroke Father 60    Hypertension Father     Psychiatric Illness Paternal Uncle         Suicide    Cancer Maternal Aunt         lung     Social History     Tobacco Use    Smoking status: Never    Smokeless tobacco: Never   Vaping Use    Vaping status: Never Used   Substance Use Topics    Alcohol use: Yes     Alcohol/week: 0.5 - 7.0 oz     Types: 1 - 14 Cans of  beer per week     Comment: occasional    Drug use: No     He  reports being sexually active and has had partner(s) who are female.    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 90 Tablet 3    aspirin (ASA) 81 MG Chew Tab chewable tablet Chew 1 Tablet every day. 100 Tablet 3    rosuvastatin (CRESTOR) 10 MG Tab Take 1 Tablet by mouth every evening. 90 Tablet 2    fluticasone (FLONASE) 50 MCG/ACT nasal spray ADMINISTER 1 SPRAY INTO AFFECTED NOSTRIL(S) EVERY DAY. 48 mL 1    acyclovir (ZOVIRAX) 800 MG Tab TAKE 1 TABLET BY MOUTH TWICE A  Tablet 0     No current facility-administered medications for this visit.     Allergies   Allergen Reactions    Nkda [No Known Drug Allergy]        Review of Systems   Constitutional: Negative for fever, chills and malaise/fatigue.   HENT: Negative for congestion.    Eyes: Negative for pain.   Respiratory: Negative for cough and shortness of breath.    Cardiovascular: Negative for chest pain and leg swelling.   Gastrointestinal: Negative for nausea, vomiting, abdominal pain and diarrhea.   Genitourinary: Negative for dysuria and hematuria.   Skin: Negative for rash.   Neurological: Negative for dizziness,  "focal weakness and headaches.   Endo/Heme/Allergies: Does not bruise/bleed easily.   Psychiatric/Behavioral: Negative for depression.  The patient is not nervous/anxious.      Objective:   /80 (BP Location: Left arm, Patient Position: Sitting, BP Cuff Size: Large adult)   Pulse 70   Temp 36.4 °C (97.5 °F) (Temporal)   Ht 1.854 m (6' 1\")   Wt 82.6 kg (182 lb)   SpO2 96%   BMI 24.01 kg/m²      Wt Readings from Last 4 Encounters:   02/14/25 82.6 kg (182 lb)   07/26/24 77.1 kg (170 lb)   06/13/24 78 kg (172 lb)   02/09/24 80.3 kg (177 lb)           Physical Exam:  Constitutional: Alert, no distress, well-groomed.  Skin: No rashes in visible areas.  Eye: Round. Conjunctiva clear, lids normal. No icterus.   ENMT: Lips pink without lesions, good dentition, moist mucous membranes. Phonation normal.  Neck: No masses, no thyromegaly. Moves freely without pain.  Respiratory: Unlabored respiratory effort, no cough or audible wheeze  Psych: Alert and oriented x3, normal affect and mood.       Assessment and Plan:     1. Annual physical exam    2. Screening for colon cancer  - Referral to GI for Colonoscopy    3. Other neutropenia (HCC)  - Referral to Hematology Oncology    4. Need for vaccination  - Pneumococcal Conjugate Vaccine 20-Valent (6 wks+)      We reviewed anticipatory guidelines  The patient is prevnar-20  The patient is  UTD  for annual labs  We discussed diet and exercise  Specifically we discussed needing 150 minutes of exercise weekly  Also to incorporate and aerobic exercises  We discussed sunscreen  We discussed seatbelt safety       Health maintenance:     Labs per orders  Immunizations per orders  Patient counseled about skin care, diet, supplements, and exercise.  Discussed diet and exercise     Follow-up: Return in about 6 months (around 8/14/2025) for Reevaluation.   "

## 2025-02-18 NOTE — Clinical Note
REFERRAL APPROVAL NOTICE         Sent on February 17, 2025                   Joel Cobb  [address] [place] [address]                   Dear Mr. Cobb,    After a careful review of the medical information and benefit coverage, [facility] has processed your referral. See below for additional details.    If applicable, you must be actively enrolled with your insurance for coverage of the authorized service. If you have any questions regarding your coverage, please contact your insurance directly.    REFERRAL INFORMATION   Referral #:  [id number]  Referred-To Provider    Referred-By Provider:  Gastroenterology    Siddhartha Vega M.D.   [facility]      [address] [place] [address]  [phone] [address] [facility] [place] [address]  [phone]    Referral Start Date:  02/14/2025  Referral End Date:   02/14/2026             SCHEDULING  If you do not already have an appointment, please call [phone] to make an appointment.     MORE INFORMATION  If you do not already have a [employer] account, sign up at: [email or web address]  You can access your medical information, make appointments, see lab results, billing information, and more.  If you have questions regarding this referral, please contact  the [facility] Referrals department at:             [phone]. Monday - Friday 8:00AM - 5:00PM.     Sincerely,    [facility]

## 2025-03-27 DIAGNOSIS — D70.8 OTHER NEUTROPENIA (HCC): ICD-10-CM

## 2025-05-07 ENCOUNTER — PATIENT MESSAGE (OUTPATIENT)
Dept: CARDIOLOGY | Facility: MEDICAL CENTER | Age: 65
End: 2025-05-07
Payer: COMMERCIAL

## 2025-05-07 DIAGNOSIS — E78.5 DYSLIPIDEMIA: ICD-10-CM

## 2025-05-07 DIAGNOSIS — I70.0 ATHEROSCLEROSIS OF AORTA (HCC): ICD-10-CM

## 2025-05-09 RX ORDER — ROSUVASTATIN CALCIUM 10 MG/1
10 TABLET, COATED ORAL EVERY EVENING
Qty: 90 TABLET | Refills: 0 | Status: SHIPPED | OUTPATIENT
Start: 2025-05-09

## 2025-05-21 ENCOUNTER — APPOINTMENT (OUTPATIENT)
Dept: LAB | Facility: MEDICAL CENTER | Age: 65
End: 2025-05-21
Payer: COMMERCIAL

## 2025-06-02 ENCOUNTER — HOSPITAL ENCOUNTER (OUTPATIENT)
Dept: LAB | Facility: MEDICAL CENTER | Age: 65
End: 2025-06-02
Attending: FAMILY MEDICINE
Payer: COMMERCIAL

## 2025-06-02 DIAGNOSIS — E78.5 DYSLIPIDEMIA: ICD-10-CM

## 2025-06-02 LAB
CHOLEST SERPL-MCNC: 144 MG/DL (ref 100–199)
FASTING STATUS PATIENT QL REPORTED: NORMAL
HDLC SERPL-MCNC: 59 MG/DL
LDLC SERPL CALC-MCNC: 73 MG/DL
TRIGL SERPL-MCNC: 62 MG/DL (ref 0–149)

## 2025-06-02 PROCEDURE — 80061 LIPID PANEL: CPT

## 2025-06-02 PROCEDURE — 36415 COLL VENOUS BLD VENIPUNCTURE: CPT

## 2025-06-03 ENCOUNTER — RESULTS FOLLOW-UP (OUTPATIENT)
Dept: CARDIOLOGY | Facility: MEDICAL CENTER | Age: 65
End: 2025-06-03

## 2025-07-21 DIAGNOSIS — R93.1 AGATSTON CAC SCORE, >400: ICD-10-CM

## 2025-07-21 RX ORDER — ASPIRIN 81 MG/1
81 TABLET, CHEWABLE ORAL
Qty: 90 TABLET | Refills: 0 | Status: SHIPPED | OUTPATIENT
Start: 2025-07-21

## 2025-07-21 NOTE — TELEPHONE ENCOUNTER
Last OV with LH on 6/13/24. 90 day courtesy refill provided. Pt needs annual appt. [facility] message sent to pt.     Schedulers: Please contact pt to schedule with LH for annual OV appt. Thank you!